# Patient Record
Sex: FEMALE | Race: WHITE
[De-identification: names, ages, dates, MRNs, and addresses within clinical notes are randomized per-mention and may not be internally consistent; named-entity substitution may affect disease eponyms.]

---

## 2019-04-23 ENCOUNTER — HOSPITAL ENCOUNTER (INPATIENT)
Dept: HOSPITAL 88 - ER | Age: 74
LOS: 6 days | Discharge: HOME | DRG: 871 | End: 2019-04-29
Attending: INTERNAL MEDICINE | Admitting: INTERNAL MEDICINE
Payer: MEDICARE

## 2019-04-23 VITALS — HEIGHT: 62 IN | WEIGHT: 113 LBS | BODY MASS INDEX: 20.8 KG/M2

## 2019-04-23 VITALS — SYSTOLIC BLOOD PRESSURE: 134 MMHG | DIASTOLIC BLOOD PRESSURE: 98 MMHG

## 2019-04-23 VITALS — DIASTOLIC BLOOD PRESSURE: 98 MMHG | SYSTOLIC BLOOD PRESSURE: 134 MMHG

## 2019-04-23 DIAGNOSIS — A41.9: Primary | ICD-10-CM

## 2019-04-23 DIAGNOSIS — J18.9: ICD-10-CM

## 2019-04-23 DIAGNOSIS — J44.0: ICD-10-CM

## 2019-04-23 DIAGNOSIS — I10: ICD-10-CM

## 2019-04-23 DIAGNOSIS — E11.9: ICD-10-CM

## 2019-04-23 DIAGNOSIS — J44.1: ICD-10-CM

## 2019-04-23 DIAGNOSIS — M06.9: ICD-10-CM

## 2019-04-23 DIAGNOSIS — J11.08: ICD-10-CM

## 2019-04-23 LAB
ALBUMIN SERPL-MCNC: 3 G/DL (ref 3.5–5)
ALBUMIN/GLOB SERPL: 0.6 {RATIO} (ref 0.8–2)
ALP SERPL-CCNC: 101 IU/L (ref 40–150)
ALT SERPL-CCNC: 15 IU/L (ref 0–55)
ANION GAP SERPL CALC-SCNC: 16.2 MMOL/L (ref 8–16)
BASOPHILS # BLD AUTO: 0.1 10*3/UL (ref 0–0.1)
BASOPHILS NFR BLD AUTO: 0.4 % (ref 0–1)
BUN SERPL-MCNC: 17 MG/DL (ref 7–26)
BUN/CREAT SERPL: 17 (ref 6–25)
CALCIUM SERPL-MCNC: 9.9 MG/DL (ref 8.4–10.2)
CHLORIDE SERPL-SCNC: 97 MMOL/L (ref 98–107)
CK MB SERPL-MCNC: 1.4 NG/ML (ref 0–5)
CK SERPL-CCNC: 32 IU/L (ref 29–168)
CO2 SERPL-SCNC: 23 MMOL/L (ref 22–29)
DEPRECATED INR PLAS: 0.98
DEPRECATED NEUTROPHILS # BLD AUTO: 20 10*3/UL (ref 2.1–6.9)
EGFRCR SERPLBLD CKD-EPI 2021: 55 ML/MIN (ref 60–?)
EOSINOPHIL # BLD AUTO: 0.3 10*3/UL (ref 0–0.4)
EOSINOPHIL NFR BLD AUTO: 1.1 % (ref 0–6)
ERYTHROCYTE [DISTWIDTH] IN CORD BLOOD: 13.6 % (ref 11.7–14.4)
GLOBULIN PLAS-MCNC: 4.7 G/DL (ref 2.3–3.5)
GLUCOSE SERPLBLD-MCNC: 123 MG/DL (ref 74–118)
HCT VFR BLD AUTO: 40.3 % (ref 34.2–44.1)
HGB BLD-MCNC: 12.8 G/DL (ref 12–16)
LYMPHOCYTES # BLD: 2.5 10*3/UL (ref 1–3.2)
LYMPHOCYTES NFR BLD AUTO: 9.9 % (ref 18–39.1)
MCH RBC QN AUTO: 27.6 PG (ref 28–32)
MCHC RBC AUTO-ENTMCNC: 31.8 G/DL (ref 31–35)
MCV RBC AUTO: 86.9 FL (ref 81–99)
MONOCYTES # BLD AUTO: 2.3 10*3/UL (ref 0.2–0.8)
MONOCYTES NFR BLD AUTO: 9.1 % (ref 4.4–11.3)
NEUTS SEG NFR BLD AUTO: 78.8 % (ref 38.7–80)
PLATELET # BLD AUTO: 409 X10E3/UL (ref 140–360)
POTASSIUM SERPL-SCNC: 3.2 MMOL/L (ref 3.5–5.1)
PROTHROMBIN TIME: 13.5 SECONDS (ref 11.9–14.5)
RBC # BLD AUTO: 4.64 X10E6/UL (ref 3.6–5.1)
SODIUM SERPL-SCNC: 133 MMOL/L (ref 136–145)

## 2019-04-23 PROCEDURE — 80053 COMPREHEN METABOLIC PANEL: CPT

## 2019-04-23 PROCEDURE — 82550 ASSAY OF CK (CPK): CPT

## 2019-04-23 PROCEDURE — 82948 REAGENT STRIP/BLOOD GLUCOSE: CPT

## 2019-04-23 PROCEDURE — 80048 BASIC METABOLIC PNL TOTAL CA: CPT

## 2019-04-23 PROCEDURE — 86140 C-REACTIVE PROTEIN: CPT

## 2019-04-23 PROCEDURE — 87040 BLOOD CULTURE FOR BACTERIA: CPT

## 2019-04-23 PROCEDURE — 83735 ASSAY OF MAGNESIUM: CPT

## 2019-04-23 PROCEDURE — 84132 ASSAY OF SERUM POTASSIUM: CPT

## 2019-04-23 PROCEDURE — 85025 COMPLETE CBC W/AUTO DIFF WBC: CPT

## 2019-04-23 PROCEDURE — 94640 AIRWAY INHALATION TREATMENT: CPT

## 2019-04-23 PROCEDURE — 82553 CREATINE MB FRACTION: CPT

## 2019-04-23 PROCEDURE — 84484 ASSAY OF TROPONIN QUANT: CPT

## 2019-04-23 PROCEDURE — 71260 CT THORAX DX C+: CPT

## 2019-04-23 PROCEDURE — 71045 X-RAY EXAM CHEST 1 VIEW: CPT

## 2019-04-23 PROCEDURE — 99284 EMERGENCY DEPT VISIT MOD MDM: CPT

## 2019-04-23 PROCEDURE — 85379 FIBRIN DEGRADATION QUANT: CPT

## 2019-04-23 PROCEDURE — 87400 INFLUENZA A/B EACH AG IA: CPT

## 2019-04-23 PROCEDURE — 93005 ELECTROCARDIOGRAM TRACING: CPT

## 2019-04-23 PROCEDURE — 83880 ASSAY OF NATRIURETIC PEPTIDE: CPT

## 2019-04-23 PROCEDURE — 85610 PROTHROMBIN TIME: CPT

## 2019-04-23 PROCEDURE — 80202 ASSAY OF VANCOMYCIN: CPT

## 2019-04-23 PROCEDURE — 83605 ASSAY OF LACTIC ACID: CPT

## 2019-04-23 PROCEDURE — 36415 COLL VENOUS BLD VENIPUNCTURE: CPT

## 2019-04-23 RX ADMIN — AZITHROMYCIN SCH MLS/HR: 100 INJECTION, POWDER, LYOPHILIZED, FOR SOLUTION INTRAVENOUS at 22:00

## 2019-04-23 RX ADMIN — CEFTRIAXONE SCH MLS/HR: 100 INJECTION, POWDER, FOR SOLUTION INTRAVENOUS at 19:44

## 2019-04-23 RX ADMIN — LEVALBUTEROL HYDROCHLORIDE SCH MG: 1.25 SOLUTION RESPIRATORY (INHALATION) at 19:00

## 2019-04-23 RX ADMIN — LEVALBUTEROL HYDROCHLORIDE SCH MG: 1.25 SOLUTION RESPIRATORY (INHALATION) at 23:00

## 2019-04-23 RX ADMIN — METHYLPREDNISOLONE SODIUM SUCCINATE SCH MG: 125 INJECTION, POWDER, FOR SOLUTION INTRAMUSCULAR; INTRAVENOUS at 22:00

## 2019-04-23 NOTE — NUR
Transferred from ER via stretcher. SOB noted, 02 sat 100% 2L NC, encourage to take slow deep 
breaths in nose out mouth. Patient in tripod position. Will continue to monitor.

## 2019-04-23 NOTE — XMS REPORT
[facility] - Continuity of Care Document

                             Created on: 2019



SUSI GALICIA

External Reference #: [id number]

: 1945

Sex: Female



Demographics







                          Address                   [address] [place]  [address]

 

                          Home Phone                [phone]

 

                          Preferred Language        English

 

                          Marital Status            

 

                          Restoration Affiliation     Unknown

 

                          Race                      White

 

                          Ethnic Group              Non-





Author







                          Author                    [facility] [place]

 

                          Address                   [address]

[place]  [address]



 

                          Phone                     [phone];ext=







Support







                Name            Relationship    Address         Phone

 

                    KRISTI GALICIA    Next Of Kin         [address] [place]  [address] [phone]

 

                    KRISTI GALICIA    ECON                [address] [place]  [address] [phone]







Care Team Providers







                    Care Team Member Name    Role                Phone

 

                    DR JOEL LOYOLA             [phone]

 

                    DR JOEL LOYOLA    [place]             [phone]







Hospital Admission Diagnosis







                    Code                Admission Diagnosis    Date

 

                               Acute exacerbation of chronic obstructive airways disease     







Social History







           Element Description    Code       Description    Smoking Status Code System    Start Date    

End Date

 

           Smoking Status    [id number]    Former smoker    SNOMED-CT                







Problems







           Code       Code System    Problem Name    Start Date    End Date    Status

 

                    [id number]           SNOMED-CT           Acute exacerbation of chronic obstructive airways disease 

                    2019                              Active







Medications







        RxNorm    Medication    Dose    Route    Instructions    Indications    Start Date    End Date

                                        Status

 

                          [id number]                   Albuterol 0.833 MG/ML / Ipratropium Bromide 0.167 MG/ML Inhalant Solution

                    3 milliliter        Inhalation          inhaled every 4 to 6 hours as needed. (as needed for 

wheezing)       wheezing                                        Active







Allergies

* No Known Allergies





Results





******** Radiology Results ********







Order: [id number] XR CHEST 2 PA LATERAL* Exam Completion Date:2019 13:29





PA and lateral chest:Exam Date:  2019History:  COPD, wheezingCompared to Oc
tober 2018. The lungs are again hyperinflated but appearclear. Cardiac size 
is within normal limits. Mild atherosclerotic calcificationin the aortic arch is
again noted. There are no significant bony abnormalities.Impression: Stable lisbeth
st. Hyperinflation with no acute cardiopulmonaryabnormalities identified.This fi
nal report was electronically signed by Dr Liss Parson MD 20191:[id number] PMDi
ctated By: LISS PARSONDate:  2019 13:59







Vital Signs

* No data in the system





Advance Directives





YES LIVING WILL





             Directive Type    Effective Date         Notes        Supporting Document

 

                    Name                Address             Phone

 

             No Directive Type specified    2013 21:27    Not Specified    Not Specified    Not

 Specified                None                      No







Patient does NOT have Living Will





             Directive Type    Effective Date         Notes        Supporting Document

 

                    Name                Address             Phone

 

             No Directive Type specified    08/10/2018 11:03    Not Specified    Not Specified    Not

 Specified                None                      No







Family History

* No Data Reported





Plan of Care

* No data in the system





Procedures







             Code         Code System    Procedure Name    Target Site    Date of Procedure

 

                                       XR CHEST 2 PA LATERAL                 2019 14:05







Encounters







                Date            Code            Diagnosis       Status

 

                                (ICD10) - J441    COPD WITH ACUTE EXACERBATION    Active







Immunizations

* No data in the system





Functional Status

* No data in the system





Hospital Discharge Instructions

* No data in the system

## 2019-04-23 NOTE — XMS REPORT
[facility]

                             Created on: 2019



TIFFANY GALICIAELZBIETA

External Reference #: [id number]

: 1945

Sex: Female



Demographics







                          Address                   [address] [place]  [address]

 

                          Home Phone                [phone]

 

                          Preferred Language        Unknown

 

                          Marital Status            Unknown

 

                          Anglican Affiliation     Unknown

 

                          Race                      Other Race

 

                          Ethnic Group              UNK





Author







                          Author                    Najjar, Husam

 

                          Organization              [employer]

 

                          Address                   Unknown

 

                          Phone                     Unavailable







Care Team Providers







                    Care Team Member Name    Role                Phone

 

                    Najjar, Husam      CP                  Unavailable



                                                                



Allergies

          No Known Allergies                                                    
                                   



Problems

          





             Problem Type    Condition    Code         Onset Dates    Condition Status

 

             Problem      COPD with chronic bronchitis    J44.9                     Active

 

                          Problem                   Rheumatoid arthritis involving multiple sites, unspecified rheumatoid factor

 presence           M06.9                                   Active

 

             Problem      Chronic respiratory failure with hypoxia    J96.11                    Active

 

             Assessment    Acute and chronic respiratory failure with hypoxia    J96.21                    Active



 

             Assessment    Acute and chronic respiratory failure with hypercapnia    J96.22                    Active



 

             Assessment    COPD with acute exacerbation    J44.1                     Active



                                                                                
                                                         



Medications

          





        Medication    Code System    Code    Instructions    Start Date    End Date    Status    Dosage



 

        Amlodipine Besylate    NDC     39653426079    10 MG Orally Once a day                    Active    1 

tablet

 

        Aleve    ND     52843434299    220 MG Orally every 12 hrs                    Active    1 tablet with

 food or milk as needed

 

        Folic Acid    NDC     35263935435    1 MG Orally Once a day                    Active    1 tablet

 

           Levalbuterol HCl    NDC        27799579410    0.63 MG/3ML Inhalation every 8 hrs PRN                

                          Active                    3 ml

 

          Symbicort    NDC       40734258746    160-4.5 MCG/ACT Inhalation Twice a day                        Active

                                        2 puffs

 

                ProAir RespiClick    NDC             91570723417     108 (90 Base) MCG/ACT Inhalation every 6 hrs

                                                Active          2 puffs as needed

 

             Anoro Ellipta    NDC          58009383017    62.5-25 MCG/INH Inhalation Once a day    Oct 23, 

2018                2019        Active              1 puff

 

          Hydrocodone-Acetaminophen    NDC       20758044143    5-325 MG Orally ONCE A DAY                        Active

                                        1 tablet as needed

 

        Benazepril HCl    NDC     72273423397    40 MG Orally Once a day                    Active    1 tablet



 

        Leucovorin Calcium    NDC     28075265177    5 MG Orally ONCE A WEEK                    Active    1 tablet



 

        Methotrexate    NDC     90056352592    2.5 MG Orally ONCE A WEEK                    Active    8 tabs

 

        Zolpidem Tartrate    NDC     99075114164    10 MG Orally Once a day                    Active    1 tablet

 at bedtime as needed



                                                                                
                                                                                
                          



Results

          No Known Results                                                      
             



Summary Purpose

          [employer] Submission

## 2019-04-23 NOTE — XMS REPORT
Patient Summary Document

                             Created on: 2019



SUSI GALICIA

External Reference #: [id number]

: 1945

Sex: Female



Demographics







                          Address                   [address] [place]  [address]

 

                          Home Phone                [phone]

 

                          Preferred Language        Unknown

 

                          Marital Status            Unknown

 

                          Catholic Affiliation     Unknown

 

                          Race                      Unknown

 

                                        Additional Race(s)  

 

                          Ethnic Group              Unknown





Author







                          Author                    [facility]

 

                          [facility]

 

                          Address                   Unknown

 

                          Phone                     Unavailable







Care Team Providers







                    Care Team Member Name    Role                Phone

 

                    RENEA BURDEN    Unavailable         Unavailable

 

                    SHAE MANN        Unavailable         Unavailable

 

                    DR JOEL LOYOLA    Unavailable         Unavailable

 

                    NAJJAR, HUSAM      Unavailable         Unavailable

 

                    DR MARIALUISA BOUCHER    Unavailable         Unavailable







Problems

This patient has no known problems.



Allergies, Adverse Reactions, Alerts

This patient has no known allergies or adverse reactions.



Medications

This patient has no known medications.



Results







           Test Description    Test Time    Test Comments    Text Results    Atomic Results    Result

 Comments

 

                CT CHEST W      2019 17:45:00                                                                

                                          [place] [address]     
Patient Name: SUSI GALICIA                                   MR #: 
[id number]                     : 1945                                  
Age/Sex: 74/F  Acct #: [id number]                              Req #: [id number]
[id number]  Adm Physician:                                                      
Ordered by: GUY BURDEN MD                            Report #: [id number]
       Location: ER                                      Room/Bed:              
      
___________________________________________________________________________________________________
   Procedure: [id number] CT/CT CHEST W  Exam Date: 19                      
     Exam Time: 1727                                              REPORT STATUS:
Signed    EXAMINATION: CT scan of the chest  with contrast.      TECHNIQUE: 
Helical CT images of the chest were performed from the lung apices   to the 
level of the adrenal glands after the intravenous administration of 100   cc of 
Isovue 300.  Coronal and sagittal reformatted images were obtained.Dose   
modulation, iterative reconstruction, and/or weight based adjustment of the   
mA/kV was utilized to reduce the radiation dose to as low as reasonably   
achievable.       COMPARISON: None.      CLINICAL HISTORY:Chest pain      
DISCUSSION:      LINES/TUBES:  None.      LUNGS AND AIRWAYS:  The lungs are 
hyperinflated with emphysematous change. Mild   reticulations at the lung bases.
Scattered Tree-in-bud nodularity in the right   lower lung image 64 and 53 for 
example.        No pulmonary embolism.      PLEURA: No pneumothorax or pleural 
effusions.        HEART AND MEDIASTINUM: The thyroid gland is normal.   The 
heart and pericardium   are within normal limits.      LYMPH NODES: There is no 
mediastinal, hilar or axillary lymphadenopathy.      ABDOMEN: Limited contrast-
enhanced views of the upper abdomen show no   abnormality within the visualized 
liver, spleen, pancreas, or kidneys. The   adrenal glands are normal.      BONES
AND SOFT TISSUES: No acute bony abnormalities.      IMPRESSION:       No 
pulmonary embolism.      Pulmonary edema.      Scattered tree-in-bud nodularity 
predominantly in the right lower lung may   reflect infectious bronchiolitis or 
aspiration.      Signed by: Dr. Andrew Palisch, M.D. on 2019 5:49 PM       
Dictated By: ANDREW R PALISCH MD  Electronically Signed By: ANDREW R PALISCH MD 
on 19  Transcribed By: EZIO on 19       COPY TO:   
GUY BURDEN MD                     

 

                CHEST SINGLE (PORTABLE)    2019 15:59:00                                                   

                                                       [place] [address]      Patient Name: SUSI GALICIA                      
            MR #: [id number]                     : 1945                
                  Age/Sex: 74/F  Acct #: [id number]                           
  Req #: [id number]  Adm Physician:                                             
        Ordered by: GUY BURDEN MD                            Report #: 
[id number]        Location: ER                                      Room/Bed:    
                
___________________________________________________________________________________________________
   Procedure: [id number] DX/CHEST SINGLE (PORTABLE)  Exam Date: 19         
                  Exam Time: 1500                                              
REPORT STATUS: Signed    Examination: Single AP view of the chest.      
COMPARISON: None.      INDICATION: Shortness of breath           DISCUSSION:    
 Lines/tubes:  None.      Lungs:  Lungs are hyperinflated. No consolidation. 
Age-related interstitial   change.      Pleura:  There is no pleural effusion or
pneumothorax.      Heart and mediastinum:  The heart and the mediastinum are 
unremarkable.      Bones and soft tissues:  No acute bony abnormalities.        
  IMPRESSION:       1.   No acute cardiopulmonary abnormalities.      Signed by:
Dr. Andrew Palisch, M.D. on 2019 4:00 PM        Dictated By: ANDREW R PALISCH MD  Electronically Signed By: ANDREW R PALISCH MD on 19 1600  
Transcribed By: EZIO on 19 1600       COPY TO:   GUY BURDEN MD  
                                         

 

                CULTURE, BLOOD    2019 16:03:00                    Specimen: BloodCollected: 2019 01:43

 Status: Final      Last Updated: 2019 16:03          Culture Result 
(Final) (Final)    No Growth After 5 Days       

 

                CULTURE, ANAEROBE BLOOD    2019 16:03:00                    Specimen: BloodCollected: 2019

 01:43 Status: Final      Last Updated: 2019 16:03          Culture Result
(Final) (Final)    No Growth After 5 Days       

 

                CULTURE, BLOOD    2019 16:03:00    To start 15mins after 1st culture    Specimen:

 BloodCollected: 2019 01:43 Status: Final      Last Updated: 2019 
16:03       (1) To start 15mins after 1st culture     Culture Result (Final) 
(Final)    No Growth After 5 Days        

 

                CULTURE, ANAEROBE BLOOD    2019 16:03:00                    Specimen: BloodCollected: 2019

 01:43 Status: Final      Last Updated: 2019 16:03          Culture Result
(Final) (Final)    No Growth After 5 Days       

 

                [facility]             2019 05:02:00                      

 

   

 

                Glucose (test code=GLU)    129 mg/dl                  

 

                BUN (test code=BUN)    36.0 mg/dl      6.0-17.0         

 

                Creatinine (test code=CREA)    0.9 mg/dl       0.4-1.2          

 

                Sodium (test code=NA)    142 mmol/l      137-145          

 

                Potassium (test code=K)    4.2 mmol/l      3.5-5.0          

 

                Chloride (test code=CL)    109 mmol/l                 

 

                CO2 (test code=CO2)    27 mmol/l       22-30            

 

                Calcium (test code=CALC)    7.9 mg/dl       8.4-10.2         

 

                EGFR if  (test code=EGFRAA)    >60 mL/min/1.73m\S\2                     

 

                EGFR if Non- (test code=EGFRNA)    >60 mL/min/1.73m\S\2                    Estimated

 Glomerular Filtration Rate (eGFR) Reference Intervals Decision Points for 18 
years and older and average body mass:      >=60                  Does not 
exclude kidney disease. 30 - 59             Suggests moderate chronic kidney 
disease and                            indicates the need for further 
investigation                              including assessment of proteinuria 
and                                       cardiovascular factors. < 30          
    Usually indicates a need for referral for assessment                        
   and management of chronic kidney failure.





CBC WITH AUTO [id number] 04:54:00* 





                Test Item       Value           Reference Range    Comments

 

                WBC (test code=WBC)    8.04 10\S\3/ul    4.80-10.80       

 

                RBC (test code=RBC)    4.05 10\S\6/ul    4.20-5.40        

 

                Hemoglobin (test code=HGB)    11.7 gm/dl      12.0-14.0        

 

                Hematocrit (test code=HCT)    36.8 %          37.0-47.0        

 

                MCV (test code=MCV)    90.9 fL         81.0-99.0        

 

                MCH (test code=MCH)    28.9 pg         27.0-31.0        

 

                MCHC (test code=MCHC)    31.8 gm/dl      33.0-37.0        

 

                RDW (test code=RDWVC)    14.6 %          11.5-14.5        

 

                Platelet (test code=PLT)    247 10\S\3/ul    130-400          

 

                MPV (test code=MPV)    11.3 fL         7.4-10.4         

 

                NE% (test code=NE)    91.8 %          42.0-75.0        

 

                LY% (test code=LY)    5.6 %           13.0-42.0        

 

                MO% (test code=MO)    1.5 %           4.0-14.0         

 

                EO% (test code=EO)    0.0 %           1.0-5.0          

 

                BA% (test code=BA)    0.1 %           0.0-3.0          

 

                IG% (test code=IG%)    1.0 %           0.0-0.4          





LACTIC ACID [id number] 05:24:00* 





                Test Item       Value           Reference Range    Comments

 

                LACTATE (test code=LAC)    1.0 mmol/l      0.7-2.0          





URINALYSIS WITH [id number] 02:20:00* 





                Test Item       Value           Reference Range    Comments

 

                Color (test code=UCOLR)    LT. YELLOW                       

 

                Clarity (test code=UCLAR)    CLEAR                            

 

                Glucose (test code=UGLUC)    NEGATIVE        NEGATIVE         

 

                Bilirubin (test code=UBILI)    NEGATIVE        NEGATIVE         

 

                Ketones (test code=UKET)    NEGATIVE        NEGATIVE         

 

                Specific Gravity (test code=USPGR)    1.010           1.005-1.030      

 

                Blood (test code=UBLD)    NEGATIVE        NEGATIVE         

 

                PH (test code=UPH)    5.0             4.5-8.0          

 

                Protein (test code=UPROT)    NEGATIVE        NEGATIVE         

 

                Urobilinogen (test code=U UROB)    0.2             >0.2             

 

                Nitrite (test code=UNITR)    NEGATIVE        NEGATIVE         

 

                Leukocyte Esterase (test code=ULEUK)    NEGATIVE        NEGATIVE         

 

                WBC (test code=WBCUR)    0-5             0-5              

 

                RBC (test code=RBCUR)    None Seen       0-5              

 

                Epithial Cells (test code=U EPI)    0-5             0-10             

 

                Mucous (test code=UMUC)    None Seen       None Seen        

 

                Bacteria (test code=UBACT)    None Seen       None Seen,Trace     





[id number] 01:51:00* 





                Test Item       Value           Reference Range    Comments

 

                Glucose (test code=GLU)    140 mg/dl                  

 

                BUN (test code=BUN)    52.0 mg/dl      6.0-17.0         

 

                Creatinine (test code=CREA)    1.3 mg/dl       0.4-1.2          

 

                Sodium (test code=NA)    135 mmol/l      137-145          

 

                Potassium (test code=K)    3.7 mmol/l      3.5-5.0          

 

                Chloride (test code=CL)    101 mmol/l                 

 

                CO2 (test code=CO2)    28 mmol/l       22-30            

 

                Calcium (test code=CALC)    8.0 mg/dl       8.4-10.2         

 

                EGFR if  (test code=EGFRAA)    51 mL/min/1.73m\S\2                     

 

                EGFR if Non- (test code=EGFRNA)    42 mL/min/1.73m\S\2                    Estimated

 Glomerular Filtration Rate (eGFR) Reference Intervals Decision Points for 18 
years and older and average body mass:      >=60                  Does not 
exclude kidney disease. 30 - 59             Suggests moderate chronic kidney 
disease and                            indicates the need for further 
investigation                              including assessment of proteinuria 
and                                       cardiovascular factors. < 30          
    Usually indicates a need for referral for assessment                        
   and management of chronic kidney failure.





CBC WITH AUTO [id number] 01:38:00* 





                Test Item       Value           Reference Range    Comments

 

                WBC (test code=WBC)    9.75 10\S\3/ul    4.80-10.80       

 

                RBC (test code=RBC)    3.95 10\S\6/ul    4.20-5.40        

 

                Hemoglobin (test code=HGB)    11.7 gm/dl      12.0-14.0        

 

                Hematocrit (test code=HCT)    35.6 %          37.0-47.0        

 

                MCV (test code=MCV)    90.1 fL         81.0-99.0        

 

                MCH (test code=MCH)    29.6 pg         27.0-31.0        

 

                MCHC (test code=MCHC)    32.9 gm/dl      33.0-37.0        

 

                RDW (test code=RDWVC)    14.7 %          11.5-14.5        

 

                Platelet (test code=PLT)    256 10\S\3/ul    130-400          

 

                MPV (test code=MPV)    11.2 fL         7.4-10.4        "NOT MEASURED" RESULTS ARE DISPLAYED WHEN

 THE INSTRUMENT HAS A SUPPRESSED OR UNREPORTABLE RESULT.  THIS WILL MOST OFTEN 
HAPPEN WITH THE MPV WHEN THERE IS AN ABNORMAL PLATELET DISTRIBUTION DUE TO A CR
ITICAL LOW VALUE OR PLATELET CLUMPING.  THE RDW MAY BE SUPPRESSED IF THERE ARE 
MULTIPLE PEAKS PRESENT ON THE RBC HISTOGRAM.  IN THIS CASE, A MANUAL REVIEW OF 
THE SLIDE WILL BE PERFORMED, AND RBC MORPHOLOGY WILL BE NOTED ON THE REPORT.

 

                NE% (test code=NE)    90.2 %          42.0-75.0        

 

                LY% (test code=LY)    3.5 %           13.0-42.0        

 

                MO% (test code=MO)    5.5 %           4.0-14.0         

 

                EO% (test code=EO)    0.0 %           1.0-5.0          

 

                BA% (test code=BA)    0.1 %           0.0-3.0          

 

                IG% (test code=IG%)    0.7 %           0.0-0.4          





[id number] 15:26:00* 





                Test Item       Value           Reference Range    Comments

 

                pH (ABG) (test code=BGPH)    7.391           7.350-7.450      

 

                pCO2(T) (test code=BGPCO2(T))    42.8 mm Hg      35.0-45.0        

 

                pO2(T) (test code=BGPO2(T))    67.3 mm Hg      80.0-100.0       

 

                sO2 (test code=BGSO2)    94.7 %          90.0-99.0        

 

                Na+ (test code=BGCNA+)    137.9 mmol/l    135.0-148.0      

 

                K+ (test code=BGCK+)    3.86 mmol/l     3.50-4.50        

 

                Ca2+ (test code=BGCCA2+)    1.190 mmol/l    1.120-1.320      

 

                Cl- (test code=BGCCL-)    98 mmol/l                  

 

                tHb (test code=BGCTHB)    12.0 gm/dl      11.5-17.4        

 

                Hct (test code=BGHCT)    42.9            35.0-50.0        

 

                O2Hb (test code=GMHD3EX)    93.3 %          95.0-99.0        

 

                COHb (test code=BGFCOHB)    <2.80 %         0.5-2.5          

 

                MetHB (test code=BGMETHB)    <1.30 %         0.4-1.5          

 

                Gluc (test code=BGCGLU)    159.0 mg/dl     60.0-110.0       

 

                Lac (test code=BGCLAC)    2.60 mmol/l     1.0-1.7          

 

                Barometric Pressure (test code=BGBARO)    763.4 mm Hg     450.0-1000.0     

 

                BE(act) (test code=BGBEACT)    1 mmol/l                         

 

                HCO3 (test code=BGHCO3)    25 meq/L        22-26            

 

                ctCO2(B) (test code=BGCTCO2(B))    23 mmol/l                        

 

                FIO2 (fO2(I) (test code=BGFIO2)    0.28 %                           

 

                Drawn By (test code=BGDRAWNBY)    YENIFER CHRISTOPHER                     

 

                Collection Date (test code=BGDTCOL)    2019                       

 

                Collection Time (test code=BGCTM)    15:16                            

 

                Sample Site (test code=BGSAMPLESITE)    R Brachial                       

 

                Sample Type (test code=BGSAMPLETYPE)    Arterial                         

 

                Allens Test (test code=BGALLEN)    Acceptable                       

 

                Notified By (test code=BGNOTIFIEDBY)    YENIFER CHRISTOPHER                     

 

                Notified Whom (test code=BGNOTIFIEDWHOM)    Dr Mann                          

 

                Date Notified (test code=BGDTNOTIFIED)    2019                       

 

                Time Notified (test code=BGTMNOTIFIED)    15:24                            

 

                O2 Device (test code=BPW4DYY2)    Nasal Cannula                     

 

                Instrument ID (test code=BGINSTRID)    [id number]                            

 

                Reported By (test code=BGREPORTEDBY)    YENIFER CHRISTOPHER                     





LACTIC ACID [id number] 11:58:00* 





                Test Item       Value           Reference Range    Comments

 

                LACTATE (test code=LAC)    3.7 mmol/l      0.7-2.0          





Critical values were called to Deysi López RN by [id number] on 19 11:58 CST.
Results were not read back.XR CHEST AP/PA 1 [id number] 10:53:27A single 
frontal view of the chest.HISTORY:  Cough, shortness of breath, COPDCOMPARISON: 
Chest radiograph January 15, 2019.DISCUSSION:Portable technique, which partially
limits sensitivity and specificity.Overlying monitoring leads.Tubes/Lines:  
NoneLungs and pleura:Stable appearing biapical pleural-parenchymal scarring.The 
lungs are mildly hyperinflated.No evidence of a consolidative pneumonia or 
pulmonary alveolar edema.No definite pleural effusion or pneumothorax is ident
ified.Heart and mediastinum:The cardiomediastinal silhouette appears unremarkabl
e.Bones and soft tissues:Unremarkable, given this limited evaluation.IMPRESSION:
Persistent hyperinflation, compatible with the provided history of COPD.This fin
al report was electronically signed by Dr Elia Momin DO 201910:47 AMDicta
violeta By: Maia MOMINte:  2019 10:[id number] 05:37:00* 





                Test Item       Value           Reference Range    Comments

 

                Magnesium (test code=MG)    2.3 mg/dl       1.6-2.6          





[id number] 05:21:00* 





                Test Item       Value           Reference Range    Comments

 

                Glucose (test code=GLU)    146 mg/dl                  

 

                BUN (test code=BUN)    40.0 mg/dl      6.0-17.0         

 

                Creatinine (test code=CREA)    1.0 mg/dl       0.4-1.2          

 

                Sodium (test code=NA)    138 mmol/l      137-145          

 

                Potassium (test code=K)    4.1 mmol/l      3.5-5.0          

 

                Chloride (test code=CL)    101 mmol/l                 

 

                CO2 (test code=CO2)    32 mmol/l       22-30            

 

                Calcium (test code=CALC)    9.1 mg/dl       8.4-10.2         

 

                EGFR if  (test code=EGFRAA)    >60 mL/min/1.73m\S\2                     

 

                EGFR if Non- (test code=EGFRNA)    57 mL/min/1.73m\S\2                    Estimated

 Glomerular Filtration Rate (eGFR) Reference Intervals Decision Points for 18 
years and older and average body mass:      >=60                  Does not 
exclude kidney disease. 30 - 59             Suggests moderate chronic kidney 
disease and                            indicates the need for further 
investigation                              including assessment of proteinuria 
and                                       cardiovascular factors. < 30          
    Usually indicates a need for referral for assessment                        
   and management of chronic kidney failure.





CBC WITH AUTO [id number] 04:49:00* 





                Test Item       Value           Reference Range    Comments

 

                WBC (test code=WBC)    8.38 10\S\3/ul    4.80-10.80       

 

                RBC (test code=RBC)    4.05 10\S\6/ul    4.20-5.40        

 

                Hemoglobin (test code=HGB)    11.7 gm/dl      12.0-14.0        

 

                Hematocrit (test code=HCT)    36.9 %          37.0-47.0        

 

                MCV (test code=MCV)    91.1 fL         81.0-99.0        

 

                MCH (test code=MCH)    28.9 pg         27.0-31.0        

 

                MCHC (test code=MCHC)    31.7 gm/dl      33.0-37.0        

 

                RDW (test code=RDWVC)    14.4 %          11.5-14.5        

 

                Platelet (test code=PLT)    243 10\S\3/ul    130-400          

 

                MPV (test code=MPV)    11.0 fL         7.4-10.4         

 

                NE% (test code=NE)    89.0 %          42.0-75.0        

 

                LY% (test code=LY)    5.8 %           13.0-42.0        

 

                MO% (test code=MO)    4.7 %           4.0-14.0         

 

                EO% (test code=EO)    0.0 %           1.0-5.0          

 

                BA% (test code=BA)    0.0 %           0.0-3.0          

 

                IG% (test code=IG%)    0.5 %           0.0-0.4          





[id number] 05:55:00* 





                Test Item       Value           Reference Range    Comments

 

                Glucose (test code=GLU)    126 mg/dl                  

 

                BUN (test code=BUN)    27.0 mg/dl      6.0-17.0         

 

                Creatinine (test code=CREA)    0.8 mg/dl       0.4-1.2          

 

                Sodium (test code=NA)    137 mmol/l      137-145          

 

                Potassium (test code=K)    4.3 mmol/l      3.5-5.0          

 

                Chloride (test code=CL)    100 mmol/l                 

 

                CO2 (test code=CO2)    30 mmol/l       22-30            

 

                Calcium (test code=CALC)    9.3 mg/dl       8.4-10.2         

 

                EGFR if  (test code=EGFRAA)    >60 mL/min/1.73m\S\2                     

 

                EGFR if Non- (test code=EGFRNA)    >60 mL/min/1.73m\S\2                    Estimated

 Glomerular Filtration Rate (eGFR) Reference Intervals Decision Points for 18 
years and older and average body mass:      >=60                  Does not 
exclude kidney disease. 30 - 59             Suggests moderate chronic kidney 
disease and                            indicates the need for further 
investigation                              including assessment of proteinuria 
and                                       cardiovascular factors. < 30          
    Usually indicates a need for referral for assessment                        
   and management of chronic kidney failure.





CBC WITH AUTO [id number] 05:24:00* 





                Test Item       Value           Reference Range    Comments

 

                WBC (test code=WBC)    9.28 10\S\3/ul    4.80-10.80       

 

                RBC (test code=RBC)    4.06 10\S\6/ul    4.20-5.40        

 

                Hemoglobin (test code=HGB)    11.9 gm/dl      12.0-14.0        

 

                Hematocrit (test code=HCT)    36.5 %          37.0-47.0        

 

                MCV (test code=MCV)    89.9 fL         81.0-99.0        

 

                MCH (test code=MCH)    29.3 pg         27.0-31.0        

 

                MCHC (test code=MCHC)    32.6 gm/dl      33.0-37.0        

 

                RDW (test code=RDWVC)    14.1 %          11.5-14.5        

 

                Platelet (test code=PLT)    261 10\S\3/ul    130-400          

 

                MPV (test code=MPV)    10.8 fL         7.4-10.4        "NOT MEASURED" RESULTS ARE DISPLAYED WHEN

 THE INSTRUMENT HAS A SUPPRESSED OR UNREPORTABLE RESULT.  THIS WILL MOST OFTEN 
HAPPEN WITH THE MPV WHEN THERE IS AN ABNORMAL PLATELET DISTRIBUTION DUE TO A CR
ITICAL LOW VALUE OR PLATELET CLUMPING.  THE RDW MAY BE SUPPRESSED IF THERE ARE 
MULTIPLE PEAKS PRESENT ON THE RBC HISTOGRAM.  IN THIS CASE, A MANUAL REVIEW OF 
THE SLIDE WILL BE PERFORMED, AND RBC MORPHOLOGY WILL BE NOTED ON THE REPORT.

 

                NE% (test code=NE)    84.0 %          42.0-75.0        

 

                LY% (test code=LY)    8.4 %           13.0-42.0        

 

                MO% (test code=MO)    7.2 %           4.0-14.0         

 

                EO% (test code=EO)    0.0 %           1.0-5.0          

 

                BA% (test code=BA)    0.1 %           0.0-3.0          

 

                IG% (test code=IG%)    0.3 %           0.0-0.4          





[id number] 18:37:00* 





                Test Item       Value           Reference Range    Comments

 

                Glucose (test code=GLU)    147 mg/dl                  

 

                BUN (test code=BUN)    16.0 mg/dl      7.0-18.0         

 

                Creatinine (test code=CREA)    0.8 mg/dl       0.5-1.3          

 

                Sodium (test code=NA)    136 mmol/l      137-145          

 

                Potassium (test code=K)    4.7 mmol/l      3.5-5.1          

 

                Chloride (test code=CL)    101 mmol/l                 

 

                CO2 (test code=CO2)    27 mmol/l       21-32            

 

                Calcium (test code=CALC)    8.7 mg/dl       8.5-10.1         

 

                EGFR if  (test code=EGFRAA)    >60 mL/min/1.73m\S\2                     

 

                EGFR if Non- (test code=EGFRNA)    >60 mL/min/1.73m\S\2                    Estimated

 Glomerular Filtration Rate (eGFR) Reference Intervals Decision Points for 18 
years and older and average body mass:      >=60                  Does not 
exclude kidney disease. 30 - 59             Suggests moderate chronic kidney 
disease and                            indicates the need for further 
investigation                              including assessment of proteinuria 
and                                       cardiovascular factors. < 30          
    Usually indicates a need for referral for assessment                        
   and management of chronic kidney failure.





CBC WITH AUTO [id number] 18:35:00* 





                Test Item       Value           Reference Range    Comments

 

                WBC (test code=WBC)    3.78 10\S\3/ul    4.80-10.80       

 

                RBC (test code=RBC)    4.82 10\S\6/ul    4.20-5.40        

 

                Hemoglobin (test code=HGB)    13.8 gm/dl      12.0-14.0        

 

                Hematocrit (test code=HCT)    44.3 %          37.0-47.0        

 

                MCV (test code=MCV)    91.9 fL         81.0-99.0        

 

                MCH (test code=MCH)    28.6 pg         27.0-31.0        

 

                MCHC (test code=MCHC)    31.2 gm/dl      33.0-37.0        

 

                RDW (test code=RDWVC)    14.3 %          11.5-14.5        

 

                Platelet (test code=PLT)    186 10\S\3/ul    130-400          

 

                MPV (test code=MPV)    11.4 fL         7.4-10.4         

 

                NE% (test code=NE)    82.7 %          42.0-75.0        

 

                LY% (test code=LY)    14.6 %          13.0-42.0        

 

                MO% (test code=MO)    2.1 %           4.0-14.0         

 

                EO% (test code=EO)    0.0 %           1.0-5.0          

 

                BA% (test code=BA)    0.3 %           0.0-3.0          

 

                IG% (test code=IG%)    0.3 %           0.0-0.4          





MAGNESIUM2019-01-15 14:57:00* 





                Test Item       Value           Reference Range    Comments

 

                Magnesium (test code=MG)    3.9 mg/dl       1.6-2.6          





CMP2019-01-15 14:55:00* 





                Test Item       Value           Reference Range    Comments

 

                Glucose (test code=GLU)    112 mg/dl                  

 

                BUN (test code=BUN)    14.0 mg/dl      6.0-17.0         

 

                Creatinine (test code=CREA)    0.8 mg/dl       0.4-1.2          

 

                Sodium (test code=NA)    137 mmol/l      137-145          

 

                Potassium (test code=K)    4.4 mmol/l      3.5-5.0          

 

                Chloride (test code=CL)    101 mmol/l                 

 

                CO2 (test code=CO2)    29 mmol/l       22-30            

 

                Calcium (test code=CALC)    9.2 mg/dl       8.4-10.2         

 

                T Protein (test code=TP)    7.7 gm/dl       5.1-8.7          

 

                Albumin (test code=ALB)    3.6 gm/dl       3.5-4.6          

 

                A/G Ratio (test code=AGRAT)    0.9 %           1.1-2.2          

 

                AST (SGOT) (test code=AST)    25 U/L          11-36            

 

                ALT (SGPT) (test code=ALT)    21 U/L          11-40            

 

                Alkaline Phos (test code=ALKP)    78 U/L                     

 

                Total Bilirubin (test code=TBIL)    0.2 mg/dl       0.2-1.2          

 

                Globulin (test code=GLOBU)    4.1 gm/dl       2.3-3.5          

 

                Calcium, Corrected (test code=CALCCORR)    9.5 mg/dl       8.4-10.2        Various formulas exist

 for corrected serum calcium results, each yielding different values.  This 
corrected result was based on the formula: Corrected Calcium=SerumCalcium + [0.8
 * ( 4 - SerumAlbumin)] 

 

                EGFR if  (test code=EGFRAA)    >60 mL/min/1.73m\S\2                     

 

                EGFR if Non- (test code=EGFRNA)    >60 mL/min/1.73m\S\2                    Estimated

 Glomerular Filtration Rate (eGFR) Reference Intervals Decision Points for 18 
years and older and average body mass:      >=60                  Does not 
exclude kidney disease. 30 - 59             Suggests moderate chronic kidney 
disease and                            indicates the need for further 
investigation                              including assessment of proteinuria 
and                                       cardiovascular factors. < 30          
    Usually indicates a need for referral for assessment                        
   and management of chronic kidney failure.





TROPONIN-I Quantitative2019-01-15 14:55:00* 





                Test Item       Value           Reference Range    Comments

 

                Troponin-I (test code=TROP)    <0.015 ng/ml    0.000-0.034     The 99th Percentile URL 

is 0.045 ng/mL for the Siemens [place] Troponin I.  The Joint European Society of 
Cardiology/American College of Cardiology (ESC/ACC) and the National Academy of 
Clinical Biochemistry Standards of Laboratory Practices (NACB) recommends that 
the diagnosis of AMI includes the presence of clinical history suggestive of 
Acute Coronary Syndrome (ACS) and a maximum concentration of cardiac troponin 
exceeding the 99th percentile of a normal reference population [upper reference 
limit (URL)] on at least one occasion during the first 24 hours after the 
clinical event.   





PRO-BNP(B-Type Natriuretic Peptide)2019-01-15 14:55:00* 





                Test Item       Value           Reference Range    Comments

 

                Pro-BNP(B-Peptide) (test code=PROBNP)    810 pg/ml       0-450            





ABGs2019-01-15 14:44:00* 





                Test Item       Value           Reference Range    Comments

 

                pH (ABG) (test code=BGPH)    7.325           7.350-7.450      

 

                pCO2(T) (test code=BGPCO2(T))    50.9 mm Hg      35.0-45.0        

 

                pO2(T) (test code=BGPO2(T))    117.3 mm Hg     80.0-100.0       

 

                sO2 (test code=BGSO2)    98.5 %          90.0-99.0        

 

                Na+ (test code=BGCNA+)    138.3 mmol/l    135.0-148.0      

 

                K+ (test code=BGCK+)    3.80 mmol/l     3.50-4.50        

 

                Ca2+ (test code=BGCCA2+)    1.250 mmol/l    1.120-1.320      

 

                Cl- (test code=BGCCL-)    98 mmol/l                  

 

                tHb (test code=BGCTHB)    13.2 gm/dl      11.5-17.4        

 

                Hct (test code=BGHCT)    44.7            35.0-50.0        

 

                O2Hb (test code=FREY0GK)    >95.0 %         95.0-99.0        

 

                COHb (test code=BGFCOHB)    <2.80 %         0.5-2.5          

 

                MetHB (test code=BGMETHB)    <1.30 %         0.4-1.5          

 

                Gluc (test code=BGCGLU)    136.0 mg/dl     60.0-110.0       

 

                Lac (test code=BGCLAC)    <1.60 mmol/l    1.0-1.7          

 

                Barometric Pressure (test code=BGBARO)    770.3 mm Hg     450.0-1000.0     

 

                BE(act) (test code=BGBEACT)    -1 mmol/l                        

 

                HCO3 (test code=BGHCO3)    24 meq/L        22-26            

 

                ctCO2(B) (test code=BGCTCO2(B))    23 mmol/l                        

 

                FIO2 (fO2(I) (test code=BGFIO2)    0.30 %                           

 

                Drawn By (test code=BGDRAWNBY)    YENIFER CHRISTOPHER                     

 

                Collection Date (test code=BGDTCOL)    01/15/2019                       

 

                Collection Time (test code=BGCTM)    13:56                            

 

                Sample Site (test code=BGSAMPLESITE)    L Brachial                       

 

                Sample Type (test code=BGSAMPLETYPE)    Arterial                         

 

                Allens Test (test code=BGALLEN)    Acceptable                       

 

                Notified By (test code=BGNOTIFIEDBY)    YENIFER CHRISTOPHER                     

 

                Notified Whom (test code=BGNOTIFIEDWHOM)    Dr Ordoñez                        

 

                Date Notified (test code=BGDTNOTIFIED)    01/15/2019                       

 

                Time Notified (test code=BGTMNOTIFIED)    14:02                            

 

                O2 Device (test code=GOH1OAN2)    BIPAP                            

 

                Instrument ID (test code=BGINSTRID)    [id number]                            

 

                Reported By (test code=BGREPORTEDBY)    YENIFER CHRISTOPHER                     





PT AND INR2019-01-15 14:39:00* 





                Test Item       Value           Reference Range    Comments

 

                Protime (test code=PT)    10.0 seconds    9.0-11.8         

 

                INR (test code=INR)    1.0             0.9-1.1         INR results are intended ONLY to monitor Oral

 Anticoagulant therapy in stablized patients.  The INR Therapeutic Range is 2.0 
- 3.0 Patients with a mechanical heart, the INR Range is 2.5 - 3.5





XR CHEST AP/PA 1 VIEW2019-01-15 14:33:12Portable chest January 15, 2019 at 1409 
hrs.History: DyspneaCompared to 2019. Hyperinflation is again noted.
The lungs are clearof infiltrates. No vascular congestion is noted. Cardiac size
is within normallimits. The bony thorax is intact.Impression: Stable chest. 
Hyperinflation is again noted. There is no acuteabnormality.This final report 
was electronically signed by Dr Eldon Lux MD 1/15/30838:27 PMDictated By: 
ELDON LUXDate:  01/15/2019 14:27CBC WITH AUTO DIFF2019-01-15 14:21:00* 





                Test Item       Value           Reference Range    Comments

 

                WBC (test code=WBC)    11.80 10\S\3/ul    4.80-10.80       

 

                RBC (test code=RBC)    4.58 10\S\6/ul    4.20-5.40        

 

                Hemoglobin (test code=HGB)    13.3 gm/dl      12.0-14.0        

 

                Hematocrit (test code=HCT)    41.8 %          37.0-47.0        

 

                MCV (test code=MCV)    91.3 fL         81.0-99.0        

 

                MCH (test code=MCH)    29.0 pg         27.0-31.0        

 

                MCHC (test code=MCHC)    31.8 gm/dl      33.0-37.0        

 

                RDW (test code=RDWVC)    14.4 %          11.5-14.5        

 

                Platelet (test code=PLT)    292 10\S\3/ul    130-400          

 

                MPV (test code=MPV)    11.3 fL         7.4-10.4         

 

                NE% (test code=NE)    79.9 %          42.0-75.0        

 

                LY% (test code=LY)    11.2 %          13.0-42.0        

 

                MO% (test code=MO)    8.2 %           4.0-14.0         

 

                EO% (test code=EO)    0.1 %           1.0-5.0          

 

                BA% (test code=BA)    0.3 %           0.0-3.0          

 

                IG% (test code=IG%)    0.3 %           0.0-0.4          





PRO-BNP(B-Type Natriuretic Peptide)2019 18:42:00* 





                Test Item       Value           Reference Range    Comments

 

                Pro-BNP(B-Peptide) (test code=PROBNP)    1217 pg/ml      0-450           Result called to Minoo Mina RN-ER read back





[id number] 18:40:00* 





                Test Item       Value           Reference Range    Comments

 

                Glucose (test code=GLU)    111 mg/dl                  

 

                BUN (test code=BUN)    14.0 mg/dl      6.0-17.0         

 

                Creatinine (test code=CREA)    0.9 mg/dl       0.4-1.2          

 

                Sodium (test code=NA)    136 mmol/l      137-145          

 

                Potassium (test code=K)    3.7 mmol/l      3.5-5.0          

 

                Chloride (test code=CL)    101 mmol/l                 

 

                CO2 (test code=CO2)    27 mmol/l       22-30            

 

                Calcium (test code=CALC)    8.6 mg/dl       8.4-10.2         

 

                T Protein (test code=TP)    7.6 gm/dl       5.1-8.7          

 

                Albumin (test code=ALB)    3.3 gm/dl       3.5-4.6          

 

                A/G Ratio (test code=AGRAT)    0.8 %           1.1-2.2          

 

                AST (SGOT) (test code=AST)    22 U/L          11-36            

 

                ALT (SGPT) (test code=ALT)    13 U/L          11-40            

 

                Alkaline Phos (test code=ALKP)    86 U/L                     

 

                Total Bilirubin (test code=TBIL)    0.2 mg/dl       0.2-1.2          

 

                Globulin (test code=GLOBU)    4.3 gm/dl       2.3-3.5          

 

                Calcium, Corrected (test code=CALCCORR)    9.2 mg/dl       8.4-10.2        Various formulas exist

 for corrected serum calcium results, each yielding different values.  This 
corrected result was based on the formula: Corrected Calcium=SerumCalcium + [0.8
 * ( 4 - SerumAlbumin)] 

 

                EGFR if  (test code=EGFRAA)    >60 mL/min/1.73m\S\2                     

 

                EGFR if Non- (test code=EGFRNA)    >60 mL/min/1.73m\S\2                    Estimated

 Glomerular Filtration Rate (eGFR) Reference Intervals Decision Points for 18 
years and older and average body mass:      >=60                  Does not 
exclude kidney disease. 30 - 59             Suggests moderate chronic kidney 
disease and                            indicates the need for further 
investigation                              including assessment of proteinuria 
and                                       cardiovascular factors. < 30          
    Usually indicates a need for referral for assessment                        
   and management of chronic kidney failure.





CBC WITH AUTO [id number] 18:17:00* 





                Test Item       Value           Reference Range    Comments

 

                WBC (test code=WBC)    7.15 10\S\3/ul    4.80-10.80       

 

                RBC (test code=RBC)    4.11 10\S\6/ul    4.20-5.40        

 

                Hemoglobin (test code=HGB)    12.2 gm/dl      12.0-14.0        

 

                Hematocrit (test code=HCT)    37.5 %          37.0-47.0        

 

                MCV (test code=MCV)    91.2 fL         81.0-99.0        

 

                MCH (test code=MCH)    29.7 pg         27.0-31.0        

 

                MCHC (test code=MCHC)    32.5 gm/dl      33.0-37.0        

 

                RDW (test code=RDWVC)    14.6 %          11.5-14.5        

 

                Platelet (test code=PLT)    234 10\S\3/ul    130-400          

 

                MPV (test code=MPV)    10.4 fL         7.4-10.4        "NOT MEASURED" RESULTS ARE DISPLAYED WHEN

 THE INSTRUMENT HAS A SUPPRESSED OR UNREPORTABLE RESULT.  THIS WILL MOST OFTEN 
HAPPEN WITH THE MPV WHEN THERE IS AN ABNORMAL PLATELET DISTRIBUTION DUE TO A CR
ITICAL LOW VALUE OR PLATELET CLUMPING.  THE RDW MAY BE SUPPRESSED IF THERE ARE 
MULTIPLE PEAKS PRESENT ON THE RBC HISTOGRAM.  IN THIS CASE, A MANUAL REVIEW OF 
THE SLIDE WILL BE PERFORMED, AND RBC MORPHOLOGY WILL BE NOTED ON THE REPORT.

 

                NE% (test code=NE)    70.7 %          42.0-75.0        

 

                LY% (test code=LY)    12.4 %          13.0-42.0        

 

                MO% (test code=MO)    15.7 %          4.0-14.0         

 

                EO% (test code=EO)    0.1 %           1.0-5.0          

 

                BA% (test code=BA)    0.7 %           0.0-3.0          

 

                IG% (test code=IG%)    0.4 %           0.0-0.4          





XR CHEST 2 PA [id number] 14:05:35PA and lateral chest:Exam Date:  
2019History:  COPD, wheezingCompared to 2018. The lungs are 
again hyperinflated but appearclear. Cardiac size is within normal limits. Mild 
atherosclerotic calcificationin the aortic arch is again noted. There are no 
significant bony abnormalities.Impression: Stable chest. Hyperinflation with no 
acute cardiopulmonaryabnormalities identified.This final report was 
electronically signed by Dr Eldon Lux MD 20191:[id number] PMDictated By: 
Nino LUXte:  2019 13:59CT ANGIO CHEST W/WO or W/ CONT2018-10-16 
15:29:3320 g Cathlon Above the Antecubital or higher requi redCTA chest:History:
COPDCT angiography of the chest was performed using multidetector helical 
imagingfollowing bolus intravenous injection of 100 mL of Isovue-370. 
Imagepostprocessing was performed on the same workstation yielding coronal 3-D 
MIPimages and sagittal reconstructions. Dose reduction technique was employed 
usingautomated exposure control and adjustment of mA and/or kV according to juliana
entsize. Total  mGy-cm.No pulmonary embolus is identified. The thoracic a
jon and visualized upperabdominal aorta show no acute abnormality. Calcific ath
erosclerosis is present.The lungs are mild hyperinflated and are clear of infilt
rates. Moderateemphysematous changes are present, primarily in the upper lobes. 
There is nopleural or pericardial effusion. Sections through the visualized uppe
r abdomenshow no acute abnormality. The bony thorax is intact.Impression:No pulm
onary embolus or acute vascular abnormality is identified.Atherosclerosis and ch
anges of emphysema are noted.This final report was electronically signed by Dr MIRNA Lux MD 10/16/59680:23 PMDictated By: Yoav LUX:  10/16/201
8 15:23BLOOD UREA NTIROGEN (BUN)2018-10-16 14:26:00* 





                Test Item       Value           Reference Range    Comments

 

                BUN (test code=BUN)    12.0 mg/dl      6.0-17.0         





CREATININE, Serum2018-10-16 14:26:00* 





                Test Item       Value           Reference Range    Comments

 

                Creatinine (test code=CREA)    0.8 mg/dl       0.4-1.2          

 

                EGFR if  (test code=EGFRAA)    >60 mL/min/1.73m\S\2                     

 

                EGFR if Non- (test code=EGFRNA)    >60 mL/min/1.73m\S\2                    Estimated

 Glomerular Filtration Rate (eGFR) Reference Intervals Decision Points for 18 
years and older and average body mass:      >=60                  Does not 
exclude kidney disease. 30 - 59             Suggests moderate chronic kidney 
disease and                            indicates the need for further 
investigation                              including assessment of proteinuria 
and                                       cardiovascular factors. < 30          
    Usually indicates a need for referral for assessment                        
   and management of chronic kidney failure.





XR CHEST 2 PA LATERAL2018-10-04 15:12:02PA and lateral chest:History: 
DyspneaCompared to 2011. The lungs are hyperinflated and 
hyperlucent. Noinfiltrate or vascular congestion is noted. Cardiac size is 
within normallimits. Vascular calcification in the thoracic aorta is again 
noted. The bonythorax appears intact.Impression: Stable chest. Changes of 
emphysema with no acute abnormalityidentified.This final report was electro
nically signed by Dr Eldon Lux MD 10/4/21294:05 PMDictated By: LAYNE LUX
CHARDDate:  10/04/2018 15:05US VEINS BILAT LEGS Doppler2018-08-10 13:04:47
Bilateral lower extremity venous Doppler ultrasound:Exam Date:  8/10/2018Toribio redman Physician:  Dr Marialuisa OrlandonClinical Indication: Bilateral lower extremity swel
lingDuplex scanning, spectral analysis and real-time grayscale and color Doppler
imaging of the bilateral lower extremity venous system was performed.The deep ve
ins of both lower extremities were compressible. Spontaneous phasicflow and augm
entation was noted. No intraluminal thrombus was visualized. Thevisualized great
er saphenous vein appeared patent. No popliteal cyst isidentified.Impression: No
evidence of DVT.This final report was electronically signed by Dr Eldon hawkins MD 8/10/169971:58 PMDictated By: ELDON LUXDate:  08/10/2018 13:04

## 2019-04-23 NOTE — XMS REPORT
[facility] - Continuity of Care Document

                             Created on: 2019



FELIX GALICIA

External Reference #: [id number]

: 1944

Sex: Female



Demographics







                          Address                   [address] [place]  [address]

 

                          Home Phone                [phone]

 

                          Preferred Language        English

 

                          Marital Status            

 

                          Tem[employer] Affiliation     Unknown

 

                          Race                      White

 

                          Ethnic Group              Non-





Author







                          Author                    [facility]

 

                          Organization              [facility]

 

                          Address                   [address]

[place]  [address]



 

                          Phone                     [phone];ext=







Care Team Providers







                    Care Team Member Name    Role                Phone

 

                    ROC CHING    Admphys             [phone]

 

                    ROC CHING    Attphys             [phone]







Hospital Admission Diagnosis

* No data in the System





Social History







           Element Description    Code       Description    Smoking Status Code System    Start Date    

End Date

 

           Smoking Status    804020764    Never smoker    SNOMED-CT                







Problems

* No data in the system





Medications







                          SNOMED CT                 Description

 

                          960988210                 Drug Treatment Unknown







Allergies

* No Allergy Data in the System





Results

* No data in the system





Vital Signs

* No data in the system





Advance Directives





Patient does NOT have Living Will





             Directive Type    Effective Date         Notes        Supporting Document

 

                    Name                Address             Phone

 

             No Directive Type specified    2019 14:15    Not Specified    Not Specified    Not

 Specified                None                      No







Family History

* No Data Reported





Plan of Care

* No data in the system





Procedures

* No data in the system





Encounters

* No data in the system





Immunizations

* No data in the system





Functional Status

* No data in the system





Hospital Discharge Instructions

* No data in the system

## 2019-04-23 NOTE — XMS REPORT
[facility]

                             Created on: 2019



Chloe Brothersdanny

External Reference #: [id number]

: 1945

Sex: Female



Demographics







                          Address                   [address] [place]  [address]

 

                          Home Phone                [phone]

 

                          Preferred Language        Unknown

 

                          Marital Status            Unknown

 

                          [employer] Affiliation     Unknown

 

                          Race                      White

 

                          Ethnic Group              UNK





Author







                          Author                    Najjar, Husam

 

                          Organization              [employer]

 

                          Address                   Unknown

 

                          Phone                     Unavailable







Care Team Providers







                    Care Team Member Name    Role                Phone

 

                    Najjar, Husam      CP                  Unavailable



                                                                



Allergies

          No Known Allergies                                                    
                                   



Problems

          





             Problem Type    Condition    Code         Onset Dates    Condition Status

 

             Problem      COPD with chronic bronchitis    J44.9                     Active

 

                          Problem                   Rheumatoid arthritis involving multiple sites, unspecified rheumatoid factor

 presence           M06.9                                   Active

 

             Problem      Chronic respiratory failure with hypoxia    J96.11                    Active

 

             Assessment    Acute and chronic respiratory failure with hypoxia    J96.21                    Active



 

             Assessment    Acute and chronic respiratory failure with hypercapnia    J96.22                    Active



 

             Assessment    COPD with acute exacerbation    J44.1                     Active



                                                                                
                                                         



Medications

          





        Medication    Code System    Code    Instructions    Start Date    End Date    Status    Dosage



 

        Methotrexate    NDC     35489035095    2.5 MG Orally ONCE A WEEK                    Active    8 tabs

 

        Folic Acid    NDC     32986438753    1 MG Orally Once a day                    Active    1 tablet

 

        Aleve    ND     41466687525    220 MG Orally every 12 hrs                    Active    1 tablet with

 food or milk as needed

 

             Anoro Ellipta    NDC          99969983909    62.5-25 MCG/INH Inhalation Once a day    Oct 23, 

2018                2019        Active              1 puff

 

        Amlodipine Besylate    NDC     78573113784    10 MG Orally Once a day                    Active    1 

tablet

 

          Hydrocodone-Acetaminophen    NDC       74229604066    5-325 MG Orally ONCE A DAY                        Active

                                        1 tablet as needed

 

        Leucovorin Calcium    NDC     46683417319    5 MG Orally ONCE A WEEK                    Active    1 tablet



 

        Zolpidem Tartrate    NDC     06689501292    10 MG Orally Once a day                    Active    1 tablet

 at bedtime as needed

 

          Symbicort    NDC       34222754253    160-4.5 MCG/ACT Inhalation Twice a day                        Active

                                        2 puffs

 

                ProAir RespiClick    NDC             96245358882     108 (90 Base) MCG/ACT Inhalation every 6 hrs

                                                Active          2 puffs as needed

 

        Benazepril HCl    NDC     78750534736    40 MG Orally Once a day                    Active    1 tablet



 

           Levalbuterol HCl    NDC        70387443139    0.63 MG/3ML Inhalation every 8 hrs PRN                

                          Active                    3 ml



                                                                                
                                                                                
                          



Results

          No Known Results                                                      
             



Summary Purpose

          [employer] Submission

## 2019-04-23 NOTE — XMS REPORT
[facility]

                             Created on: 10/11/2018



FRIDA SUSI

External Reference #: [id number]

: 1945

Sex: Female



Demographics







                          Address                   [address] [place]  [address]

 

                          Home Phone                [phone]

 

                          Preferred Language        Unknown

 

                          Marital Status            Unknown

 

                          Congregation Affiliation     Unknown

 

                          Race                      Other Race

 

                          Ethnic Group              UNK





Author







                          Author                    Najjar, Husam

 

                          Organization              [employer]

 

                          Address                   Unknown

 

                          Phone                     Unavailable







Care Team Providers







                    Care Team Member Name    Role                Phone

 

                    Najjar, Husam      CP                  Unavailable



                                                                



Allergies

          No Known Allergies                                                    
                                   



Problems

          





             Problem Type    Condition    Code         Onset Dates    Condition Status

 

                          Problem                   Rheumatoid arthritis involving multiple sites, unspecified rheumatoid factor

 presence           M06.9                                   Active

 

             Problem      COPD with chronic bronchitis    J44.9                     Active

 

             Assessment    COPD with chronic bronchitis    J44.9                     Active



                                                                                
                           



Medications

          No Known Medications                                                  
                           



Results

          No Known Results                                                      
             



Summary Purpose

          [employer] Submission

## 2019-04-23 NOTE — XMS REPORT
[facility]

                             Created on: 10/10/2018



SUSI GALICIA

External Reference #: [id number]

: 1945

Sex: Female



Demographics







                          Address                   [address] [place]  [address]

 

                          Home Phone                [phone]

 

                          Preferred Language        Unknown

 

                          Marital Status            Unknown

 

                          [employer] Affiliation     Unknown

 

                          Race                      Other Race

 

                          Ethnic Group              UNK





Author







                          Author                    Najjar, Husam

 

                          Organization              [employer]

 

                          Address                   Unknown

 

                          Phone                     Unavailable







Care Team Providers







                    Care Team Member Name    Role                Phone

 

                    Najjar, Husam      CP                  Unavailable



                                                                



Allergies

          No Known Allergies                                                    
                                   



Problems

          





             Problem Type    Condition    Code         Onset Dates    Condition Status

 

                          Problem                   Rheumatoid arthritis involving multiple sites, unspecified rheumatoid factor

 presence           M06.9                                   Active

 

             Problem      COPD with chronic bronchitis    J44.9                     Active



                                                                                
                 



Medications

          





        Medication    Code System    Code    Instructions    Start Date    End Date    Status    Dosage



 

          Hydrochlorothiazide    NDC       [address]-60    12.5 MG Orally Once a day PRN                        Active

                                        1 capsule in the morning

 

        Folic Acid    NDC     70491295516    1 MG Orally Once a day                    Active    1 tablet

 

        Trazodone HCl    NDC     [address]-19    50 MG Orally at HS PRN                    Active    1-2 tablet



 

        Zolpidem Tartrate    NDC     77500543978    10 MG Orally Once a day                    Active    1 tablet

 at bedtime as needed

 

        Benazepril HCl    NDC     37555309938    40 MG Orally Once a day                    Active    1 tablet



 

        Etodolac    NDC     21297651889    400 MG Orally Twice a day                    Active    1 tablet with

 food

 

        Leucovorin Calcium    NDC     [address]-28    5 MG Orally ONCE A WEEK                    Active    1

 tablet

 

             Levalbuterol HCl    NDC          [address]-97    0.63 MG/3ML Inhalation every 8 hrs PRN      

                                        Active              3 ml

 

        Methotrexate    NDC     [address]-53    2.5 MG Orally ONCE A WEEK                    Active    6 tabs



 

          Symbicort    NDC       78313215207    160-4.5 MCG/ACT Inhalation Twice a day                        Active

                                        2 puffs

 

        Amlodipine Besylate    NDC     01496077412    10 MG Orally Once a day                    Active    1 

tablet

 

           Hydrocodone-Acetaminophen    NDC        [address]-21    5-325 MG Orally ONCE A DAY                 

                          Active                    1 tablet as needed



                                                                                
                                                                                
                          



Results

          No Known Results                                                      
             



Summary Purpose

          [employer] Submission

## 2019-04-23 NOTE — XMS REPORT
[facility]

                             Created on: 2019



James Brothers

External Reference #: [id number]

: 1945

Sex: Female



Demographics







                          Address                   [address] [place]  [address]

 

                          Home Phone                [phone]

 

                          Preferred Language        Unknown

 

                          Marital Status            Unknown

 

                          [employer] Affiliation     Unknown

 

                          Race                      White

 

                          Ethnic Group              UNK





Author







                          Author                    Najjar, Husam

 

                          Organization              [employer]

 

                          Address                   Unknown

 

                          Phone                     Unavailable







Care Team Providers







                    Care Team Member Name    Role                Phone

 

                    Najjar, Husam      CP                  Unavailable



                                                                



Allergies

          No Known Allergies                                                    
                                   



Problems

          





             Problem Type    Condition    Code         Onset Dates    Condition Status

 

             Problem      COPD with chronic bronchitis    J44.9                     Active

 

                          Problem                   Rheumatoid arthritis involving multiple sites, unspecified rheumatoid factor

 presence           M06.9                                   Active

 

             Problem      Chronic respiratory failure with hypoxia    J96.11                    Active



                                                                                
                           



Medications

          No Known Medications                                                  
                           



Results

          No Known Results                                                      
             



Summary Purpose

          [employer] Submission

## 2019-04-23 NOTE — XMS REPORT
[facility]

                             Created on: 2019



TIFFANY GALICIAELZBIETA

External Reference #: [id number]

: 1945

Sex: Female



Demographics







                          Address                   [address] [place]  [address]

 

                          Home Phone                [phone]

 

                          Preferred Language        Unknown

 

                          Marital Status            Unknown

 

                          [employer] Affiliation     Unknown

 

                          Race                      Other Race

 

                          Ethnic Group              UNK





Author







                          Author                    Najjar, Husam

 

                          [facility]              [employer]

 

                          Address                   Unknown

 

                          Phone                     Unavailable







Care Team Providers







                    Care Team Member Name    Role                Phone

 

                    Najjar, Husam      CP                  Unavailable



                                                                



Allergies

          No Known Allergies                                                    
                                   



Problems

          





             Problem Type    Condition    Code         Onset Dates    Condition Status

 

             Problem      COPD with chronic bronchitis    J44.9                     Active

 

                          Problem                   Rheumatoid arthritis involving multiple sites, unspecified rheumatoid factor

 presence           M06.9                                   Active

 

             Problem      Chronic respiratory failure with hypoxia    J96.11                    Active

 

             Assessment    Acute and chronic respiratory failure with hypoxia    J96.21                    Active



 

             Assessment    Acute and chronic respiratory failure with hypercapnia    J96.22                    Active



 

             Assessment    COPD with acute exacerbation    J44.1                     Active



                                                                                
                                                         



Medications

          





        Medication    Code System    Code    Instructions    Start Date    End Date    Status    Dosage



 

        Leucovorin Calcium    NDC     29346760125    5 MG Orally ONCE A WEEK                    Active    1 tablet



 

        Zolpidem Tartrate    NDC     63469261908    10 MG Orally Once a day                    Active    1 tablet

 at bedtime as needed

 

                ProAir RespiClick    NDC             90934984805     108 (90 Base) MCG/ACT Inhalation every 6 hrs

                                                Active          2 puffs as needed

 

        Amlodipine Besylate    NDC     28906381664    10 MG Orally Once a day                    Active    1 

tablet

 

          Hydrocodone-Acetaminophen    NDC       40799559869    5-325 MG Orally ONCE A DAY                        Active

                                        1 tablet as needed

 

          Symbicort    NDC       05540441022    160-4.5 MCG/ACT Inhalation Twice a day                        Active

                                        2 puffs

 

        Methotrexate    NDC     40150143003    2.5 MG Orally ONCE A WEEK                    Active    8 tabs

 

        Benazepril HCl    NDC     65894000215    40 MG Orally Once a day                    Active    1 tablet



 

             Anoro Ellipta    NDC          48829730575    62.5-25 MCG/INH Inhalation Once a day    Oct 23, 

2018                2019        Active              1 puff

 

        Aleve    ND     95275748747    220 MG Orally every 12 hrs                    Active    1 tablet with

 food or milk as needed

 

        Folic Acid    NDC     91531183060    1 MG Orally Once a day                    Active    1 tablet

 

           Levalbuterol HCl    NDC        53572244864    0.63 MG/3ML Inhalation every 8 hrs PRN                

                          Active                    3 ml



                                                                                
                                                                                
                          



Results

          No Known Results                                                      
             



Summary Purpose

          [employer] Submission

## 2019-04-23 NOTE — XMS REPORT
[facility]

                             Created on: 2019



TIFFANY GALICIAELZBIETA

External Reference #: [id number]

: 1945

Sex: Female



Demographics







                          Address                   [address] [place]  [address]

 

                          Home Phone                [phone]

 

                          Preferred Language        Unknown

 

                          Marital Status            Unknown

 

                          Protestant Affiliation     Unknown

 

                          Race                      Other Race

 

                          Ethnic Group              UNK





Author







                          Author                    Najjar, Husam

 

                          [facility]              eClinicalWorks

 

                          Address                   Unknown

 

                          Phone                     Unavailable







Care Team Providers







                    Care Team Member Name    Role                Phone

 

                    Najjar, Husam      CP                  Unavailable



                                                                



Allergies

          No Known Allergies                                                    
                                   



Problems

          





             Problem Type    Condition    Code         Onset Dates    Condition Status

 

             Assessment    Anxiety      F41.9                     Active

 

             Problem      COPD with chronic bronchitis    J44.9                     Active

 

                          Problem                   Rheumatoid arthritis involving multiple sites, unspecified rheumatoid factor

 presence           M06.9                                   Active

 

             Problem      Chronic respiratory failure with hypoxia    J96.11                    Active

 

             Assessment    Acute and chronic respiratory failure with hypoxia    J96.21                    Active



 

             Assessment    Acute and chronic respiratory failure with hypercapnia    J96.22                    Active



 

             Assessment    COPD with acute exacerbation    J44.1                     Active



                                                                                
                                                                   



Medications

          





        Medication    Code System    Code    Instructions    Start Date    End Date    Status    Dosage



 

        Leucovorin Calcium    NDC     40609759404    5 MG Orally ONCE A WEEK                    Active    1 tablet



 

          Hydrocodone-Acetaminophen    NDC       38107910243    5-325 MG Orally ONCE A DAY                        Active

                                        1 tablet as needed

 

             Anoro Ellipta    NDC          63630952260    62.5-25 MCG/INH Inhalation Once a day    Oct 23, 

2018                2019        Active              1 puff

 

                ProAir RespiClick    NDC             99677338519     108 (90 Base) MCG/ACT Inhalation every 6 hrs

                                                Active          2 puffs as needed

 

        Benazepril HCl    NDC     42819369783    40 MG Orally Once a day                    Active    1 tablet



 

        Folic Acid    NDC     70283680081    1 MG Orally Once a day                    Active    1 tablet

 

           Levalbuterol HCl    NDC        44811042161    0.63 MG/3ML Inhalation every 8 hrs PRN                

                          Active                    3 ml

 

        Zolpidem Tartrate    NDC     28720957123    10 MG Orally Once a day                    Active    1 tablet

 at bedtime as needed

 

        Aleve    ND     58102241257    220 MG Orally every 12 hrs                    Active    1 tablet with

 food or milk as needed

 

          Symbicort    NDC       59054499593    160-4.5 MCG/ACT Inhalation Twice a day                        Active

                                        2 puffs

 

        Amlodipine Besylate    NDC     37828097917    10 MG Orally Once a day                    Active    1 

tablet

 

        Methotrexate    NDC     32408605029    2.5 MG Orally ONCE A WEEK                    Active    8 tabs



                                                                                
                                                                                
                          



Results

          No Known Results                                                      
             



Summary Purpose

          eClinicalWorks Submission

## 2019-04-23 NOTE — XMS REPORT
[facility] - Continuity of Care Document

                             Created on: 2018



MICHAEL GALICIA

External Reference #: [id number]

: 1945

Sex: Female



Demographics







                          Address                   [address] [place]  [address]

 

                          Home Phone                [phone]

 

                          Preferred Language        English

 

                          Marital Status            

 

                          Episcopalian Affiliation     Unknown

 

                          Race                      White

 

                          Ethnic Group              Non-





Author







                          Author                    [facility]

 

                          Address                   [address]

[place]  [address]



 

                          Phone                     [phone];ext=







Support







                Name            Relationship    Address         Phone

 

                    KRISTI GALICIA    ECON                [address] [place]  [address] [phone]







Care Team Providers







                    Care Team Member Name    Role                Phone

 

                    DR NEIL BOUCHER             [phone]

 

                    DR NEIL BOUCHER    [place]             [phone]







Hospital Admission Diagnosis







                    Code                Admission Diagnosis    Date

 

                    136864873           Coag./bleeding tests abnormal     







Social History







           Element Description    Code       Description    Smoking Status Code System    Start Date    

End Date

 

           Smoking Status    6034937    Former smoker    SNOMED-CT                







Problems

* No data in the system

        



Medications

  





                          SNOMED CT                 Description 

 

                          521416397                                     Drug Treatment Unknown              

                



                   



Allergies

* No Allergy Data in the System





Results





******** Radiology Results ********







Order: [id number] US VEINS BILAT LEGS Doppler* Exam Completion Date:08/10/2018 
  11:14





Bilateral lower extremity venous Doppler ultrasound:Exam Date:  8/10/2018Toribio redman Physician:  Dr Neil OrlandonClinical Indication: Bilateral lower extremity swel
lingDuplex scanning, spectral analysis and real-time grayscale and color Doppler
imaging of the bilateral lower extremity venous system was performed.The deep ve
ins of both lower extremities were compressible. Spontaneous phasicflow and augm
entation was noted. No intraluminal thrombus was visualized. Thevisualized great
er saphenous vein appeared patent. No popliteal cyst isidentified.Impression: No
evidence of DVT.This final report was electronically signed by Dr Liss hawkins MD 8/10/656455:58 PMDictated By: LISS LUXDate:  08/10/2018 13:04



        



Vital Signs

* No data in the system





Plan of Care

* No data in the system





Procedures







             Code         Code System    Procedure Name    Target Site    Date of Procedure

 

                                       US VEINS BILAT LEGS Doppler                 08/10/2018 13:04



        



Encounters







                Date            Code            Diagnosis       Status 

 

                    (ICD10) - R791      ABNORMAL COAGULATION PROFILE    Active







Immunizations

                    * No data in the system

                                           



Functional Status

* No data in the system





Hospital Discharge Instructions

* No data in the system

## 2019-04-23 NOTE — XMS REPORT
[facility] - Continuity of Care Document

                             Created on: 10/18/2018



SUSI GALICIA

External Reference #: [id number]

: 1945

Sex: Female



Demographics







                          Address                   [address] [place]  [address]

 

                          Home Phone                [phone]

 

                          Preferred Language        English

 

                          Marital Status            

 

                          Temple Affiliation     Unknown

 

                          Race                      White

 

                          Ethnic Group              Non-





Author







                          Author                    [facility]

 

                          Organization              [facility]

 

                          Address                   [address]

[place]  [address]



 

                          Phone                     [phone];ext=







Support







                Name            Relationship    Address         Phone

 

                    KRISTI GALICIA    ECON                [address] [place]  [address] [phone]







Care Team Providers







                    Care Team Member Name    Role                Phone

 

                    DR MARIALUISA BOUCHER    Admvani             [phone]

 

                    DR MARIALUISA BOUCHER    Attvani             [phone]







Hospital Admission Diagnosis







                    Code                Admission Diagnosis    Date

 

                    [id number]           Dyspnea              







Social History







           Element Description    Code       Description    Smoking Status Code System    Start Date    

End Date

 

           Smoking Status    2398138    Former smoker    SNOMED-CT                







Problems

* No data in the system





Medications







                          SNOMED CT                 Description

 

                          472979345                 Drug Treatment Unknown







Allergies

* No Allergy Data in the System





Results





******** Radiology Results ********







Order: [id number] XR CHEST 2 PA LATERAL* Exam Completion Date:10/04/2018 14:15





PA and lateral chest:History: DyspneaCompared to 2011. The lungs a
re hyperinflated and hyperlucent. Noinfiltrate or vascular congestion is noted. 
Cardiac size is within normallimits. Vascular calcification in the thoracic aort
a is again noted. The bonythorax appears intact.Impression: Stable chest. Change
s of emphysema with no acute abnormalityidentified.This final report was electro
nically signed by Dr Eldon Parson MD 10/4/47232:05 PMDictated By: LAYNE PARSON
CHARLEXIate:  10/04/2018 15:05







Vital Signs

* No data in the system





Advance Directives





YES LIVING WILL





             Directive Type    Effective Date         Notes        Supporting Document

 

                    Name                Address             Phone

 

             No Directive Type specified    2013 21:27    Not Specified    Not Specified    Not

 Specified                None                      No







Patient does NOT have Living Will





             Directive Type    Effective Date         Notes        Supporting Document

 

                    Name                Address             Phone

 

             No Directive Type specified    08/10/2018 11:03    Not Specified    Not Specified    Not

 Specified                None                      No







Family History

* No Data Reported





Plan of Care

* No data in the system





Procedures







             Code         Code System    Procedure Name    Target Site    Date of Procedure

 

                                       XR CHEST 2 PA LATERAL                 10/04/2018 15:12







Encounters







                Date            Code            Diagnosis       Status

 

                                (ICD10) -     OTHER FORMS OF DYSPNEA    Active







Immunizations

* No data in the system





Functional Status

* No data in the system





Hospital Discharge Instructions

* No data in the system

## 2019-04-23 NOTE — XMS REPORT
[facility] - Continuity of Care Document

                             Created on: 10/18/2018



GALICIASUSI CEDRIC

External Reference #: [id number]

: 1945

Sex: Female



Demographics







                          Address                   [address] [place]  [address]

 

                          Home Phone                [phone]

 

                          Preferred Language        English

 

                          Marital Status            

 

                          Baptism Affiliation     Unknown

 

                          Race                      White

 

                          Ethnic Group              Non-





Author







                          Author                    [facility]

 

                          Address                   [address] [place]  [address]



 

                          Phone                     [phone];ext=







Support







                Name            Relationship    Address         Phone

 

                    KRISTI GALICIA    ECON                [address] [place]  [address] [phone]







Care Team Providers







                    Care Team Member Name    Role                Phone

 

                    NAJJAR, HUSAM       Admvani             [phone]

 

                    NAJJARVICENTA ACEVEDO       [employer]             [phone]







Hospital Admission Diagnosis







                    Code                Admission Diagnosis    Date

 

                    [id number]            Chronic obstructive lung disease     







Social History







           Element Description    Code       Description    Smoking Status Code System    Start Date    

End Date

 

           Smoking Status    2092886    Former smoker    SNOMED-CT                







Problems

* No data in the system





Medications







                          SNOMED CT                 Description

 

                          852054013                 Drug Treatment Unknown







Allergies

* No Allergy Data in the System





Results





******** Laboratory Results ********







Order: BUN







Specimen Source: BLOOD 







Body Site:  









          LOINC     Test      Result    Flag      Range     Unit      Date

 

                    1BUN      12                  6.0-17.0    mg/dl     10/16/2018 13:04



* Performing Lab Footnotes:* [address] [place] [address] - [id number] [facility] [address] [place] [address] JON Johnson MD: DIRECTOR CHIQUIS GERARDO







________________________________________________________________________________
______













Order: CREATININE SERUM







Specimen Source: BLOOD 







Body Site:  









          LOINC     Test      Result    Flag      Range     Unit      Date

 

                    1Creatinine    0.8                 0.4-1.2    mg/dl     10/16/2018 13:04

 

                    1EGFR if     >60                           mL/min/1.73m^2    10/16/2018 13:04

 

                    1EGFR if Non-    >60                           mL/min/1.73m^2    10/16/2018 13:04

 

                                        Note:               Estimated Glomerular Filtration Rate (eGFR) Reference Intervals

Decision Points for 18 years and older and average body mass:

>=60                  Does not exclude kidney disease.

                                        30 - 59             Suggests moderate chronic kidney disease and

indicates the need for further investigation

including assessment of proteinuria and

cardiovascular factors.

< 30               Usually indicates a need for referral for assessment

and management of chronic kidney failure. 



* Performing Lab Footnotes:* [address] [place] [address] - [id number] - 
  [address] [facility] [address] -  MD: DIRECTOR CHIQUIS GERARDO







________________________________________________________________________________
______









******** Radiology Results ********







Order: [id number] CT ANGIO CHEST W/WO or W/ CONT* Exam Completion Date:10/16/2018 
  13:00





CTA chest:History: COPDCT angiography of the chest was performed using multidete
ctor helical imagingfollowing bolus intravenous injection of 100 mL of Isovue-37
0. Imagepostprocessing was performed on the same workstation yielding coronal 3-
D MIPimages and sagittal reconstructions. Dose reduction technique was employed 
usingautomated exposure control and adjustment of mA and/or kV according to juliana
entsize. Total  mGy-cm.No pulmonary embolus is identified. The thoracic a
jon and visualized upperabdominal aorta show no acute abnormality. Calcific ath
erosclerosis is present.The lungs are mild hyperinflated and are clear of infilt
rates. Moderateemphysematous changes are present, primarily in the upper lobes. 
There is nopleural or pericardial effusion. Sections through the visualized uppe
r abdomenshow no acute abnormality. The bony thorax is intact.Impression:   No p
ulmonary embolus or acute vascular abnormality is identified.Atherosclerosis and
changes of emphysema are noted.This final report was electronically signed by Dr Eldon Lux MD 10/16/00380:23 PMDictated By: Nino LUXte:  10/16/
2018 15:23







Vital Signs

* No data in the system





Advance Directives





YES LIVING WILL





             Directive Type    Effective Date         Notes        Supporting Document

 

                    Name                Address             Phone

 

             No Directive Type specified    2013 21:27    Not Specified    Not Specified    Not

 Specified                None                      No







Patient does NOT have Living Will





             Directive Type    Effective Date         Notes        Supporting Document

 

                    Name                Address             Phone

 

             No Directive Type specified    08/10/2018 11:03    Not Specified    Not Specified    Not

 Specified                None                      No







Family History

* No Data Reported





Plan of Care

* No data in the system





Procedures







             Code         Code System    Procedure Name    Target Site    Date of Procedure

 

                                       CT ANGIO CHEST W/WO or W/ CONT                 10/16/2018 15:29







Encounters







                Date            Code            Diagnosis       Status

 

                                (ICD10) - J449    COPD UNSPECIFIED    Active







Immunizations

* No data in the system





Functional Status

* No data in the system





Hospital Discharge Instructions

* No data in the system

## 2019-04-23 NOTE — DIAGNOSTIC IMAGING REPORT
EXAMINATION: CT scan of the chest  with contrast.



TECHNIQUE: Helical CT images of the chest were performed from the lung apices

to the level of the adrenal glands after the intravenous administration of 100

cc of Isovue 300.  Coronal and sagittal reformatted images were obtained.Dose

modulation, iterative reconstruction, and/or weight based adjustment of the

mA/kV was utilized to reduce the radiation dose to as low as reasonably

achievable. 



COMPARISON: None.



CLINICAL HISTORY:Chest pain



DISCUSSION:



LINES/TUBES:  None.



LUNGS AND AIRWAYS:  The lungs are hyperinflated with emphysematous change. Mild

reticulations at the lung bases. Scattered Tree-in-bud nodularity in the right

lower lung image 64 and 53 for example.  



No pulmonary embolism.



PLEURA: No pneumothorax or pleural effusions.  



HEART AND MEDIASTINUM: The thyroid gland is normal.   The heart and pericardium

are within normal limits.



LYMPH NODES: There is no mediastinal, hilar or axillary lymphadenopathy.



ABDOMEN: Limited contrast-enhanced views of the upper abdomen show no

abnormality within the visualized liver, spleen, pancreas, or kidneys. The

adrenal glands are normal.



BONES AND SOFT TISSUES: No acute bony abnormalities.



IMPRESSION: 



No pulmonary embolism.



Pulmonary edema.



Scattered tree-in-bud nodularity predominantly in the right lower lung may

reflect infectious bronchiolitis or aspiration.



Signed by: Dr. Andrew Palisch, M.D. on 4/23/2019 5:49 PM

## 2019-04-23 NOTE — XMS REPORT
[facility]

                             Created on: 2019



TIFFANY GALICIAELZBIETA

External Reference #: [id number]

: 1945

Sex: Female



Demographics







                          Address                   [address] [place]  [address]

 

                          Home Phone                [phone]

 

                          Preferred Language        Unknown

 

                          Marital Status            Unknown

 

                          Sikhism Affiliation     Unknown

 

                          Race                      Other Race

 

                          Ethnic Group              UNK





Author







                          Author                    Najjar, Husam

 

                          Organization              [employer]

 

                          Address                   Unknown

 

                          Phone                     Unavailable







Care Team Providers







                    Care Team Member Name    Role                Phone

 

                    Najjar, Husam      CP                  Unavailable



                                                                



Allergies

          No Known Allergies                                                    
                                   



Problems

          





             Problem Type    Condition    Code         Onset Dates    Condition Status

 

             Assessment    Anxiety      F41.9                     Active

 

             Problem      COPD with chronic bronchitis    J44.9                     Active

 

                          Problem                   Rheumatoid arthritis involving multiple sites, unspecified rheumatoid factor

 presence           M06.9                                   Active

 

             Problem      Chronic respiratory failure with hypoxia    J96.11                    Active

 

             Assessment    Acute and chronic respiratory failure with hypoxia    J96.21                    Active



 

             Assessment    Acute and chronic respiratory failure with hypercapnia    J96.22                    Active



 

             Assessment    COPD with acute exacerbation    J44.1                     Active



                                                                                
                                                                   



Medications

          





        Medication    Code System    Code    Instructions    Start Date    End Date    Status    Dosage



 

        Folic Acid    NDC     87037285417    1 MG Orally Once a day                    Active    1 tablet

 

        Amlodipine Besylate    NDC     58602140800    10 MG Orally Once a day                    Active    1 

tablet

 

           Levalbuterol HCl    NDC        96662480636    0.63 MG/3ML Inhalation every 8 hrs PRN                

                          Active                    3 ml

 

        Methotrexate    NDC     43446541653    2.5 MG Orally ONCE A WEEK                    Active    8 tabs

 

        Zolpidem Tartrate    NDC     30719421604    10 MG Orally Once a day                    Active    1 tablet

 at bedtime as needed

 

        Benazepril HCl    NDC     07477056824    40 MG Orally Once a day                    Active    1 tablet



 

                ProAir RespiClick    NDC             50880902033     108 (90 Base) MCG/ACT Inhalation every 6 hrs

                                                Active          2 puffs as needed

 

        Leucovorin Calcium    NDC     13968367078    5 MG Orally ONCE A WEEK                    Active    1 tablet



 

          Hydrocodone-Acetaminophen    NDC       45825642953    5-325 MG Orally ONCE A DAY                        Active

                                        1 tablet as needed

 

          Symbicort    NDC       43314492544    160-4.5 MCG/ACT Inhalation Twice a day                        Active

                                        2 puffs

 

        Aleve    ND     95674071967    220 MG Orally every 12 hrs                    Active    1 tablet with

 food or milk as needed

 

             Anoro Ellipta    NDC          40032435939    62.5-25 MCG/INH Inhalation Once a day    Oct 23, 

2018                2019        Active              1 puff



                                                                                
                                                                                
                          



Results

          No Known Results                                                      
             



Summary Purpose

          [employer] Submission

## 2019-04-23 NOTE — XMS REPORT
[facility]

                             Created on: 2019



TIFFANY GALICIAELZBIETA

External Reference #: [id number]

: 1945

Sex: Female



Demographics







                          Address                   [address] [place]  [address]

 

                          Home Phone                [phone]

 

                          Preferred Language        Unknown

 

                          Marital Status            Unknown

 

                          Evangelical Affiliation     Unknown

 

                          Race                      Other Race

 

                          Ethnic Group              UNK





Author







                          Author                    Najjar, Husam

 

                          Organization              [employer]

 

                          Address                   Unknown

 

                          Phone                     Unavailable







Care Team Providers







                    Care Team Member Name    Role                Phone

 

                    Najjar, Husam      CP                  Unavailable



                                                                



Allergies

          No Known Allergies                                                    
                                   



Problems

          





             Problem Type    Condition    Code         Onset Dates    Condition Status

 

             Problem      COPD with chronic bronchitis    J44.9                     Active

 

                          Problem                   Rheumatoid arthritis involving multiple sites, unspecified rheumatoid factor

 presence           M06.9                                   Active

 

             Problem      Chronic respiratory failure with hypoxia    J96.11                    Active

 

             Assessment    Acute and chronic respiratory failure with hypoxia    J96.21                    Active



 

             Assessment    Acute and chronic respiratory failure with hypercapnia    J96.22                    Active



 

             Assessment    COPD with acute exacerbation    J44.1                     Active



                                                                                
                                                         



Medications

          





        Medication    Code System    Code    Instructions    Start Date    End Date    Status    Dosage



 

        Aleve    ND     98728719523    220 MG Orally every 12 hrs                    Active    1 tablet with

 food or milk as needed

 

        Zolpidem Tartrate    NDC     39239293382    10 MG Orally Once a day                    Active    1 tablet

 at bedtime as needed

 

           Levalbuterol HCl    NDC        33595397575    0.63 MG/3ML Inhalation every 8 hrs PRN                

                          Active                    3 ml

 

        Leucovorin Calcium    NDC     71983752469    5 MG Orally ONCE A WEEK                    Active    1 tablet



 

          Symbicort    NDC       65396351902    160-4.5 MCG/ACT Inhalation Twice a day                        Active

                                        2 puffs

 

             Anoro Ellipta    NDC          14652240387    62.5-25 MCG/INH Inhalation Once a day    Oct 23, 

2018                2019        Active              1 puff

 

                ProAir RespiClick    NDC             01048270513     108 (90 Base) MCG/ACT Inhalation every 6 hrs

                                                Active          2 puffs as needed

 

        Benazepril HCl    NDC     49082835979    40 MG Orally Once a day                    Active    1 tablet



 

        Folic Acid    NDC     71459525905    1 MG Orally Once a day                    Active    1 tablet

 

          Hydrocodone-Acetaminophen    NDC       95173174302    5-325 MG Orally ONCE A DAY                        Active

                                        1 tablet as needed

 

        Methotrexate    NDC     06965352915    2.5 MG Orally ONCE A WEEK                    Active    8 tabs

 

        Amlodipine Besylate    NDC     02945267733    10 MG Orally Once a day                    Active    1 

tablet



                                                                                
                                                                                
                          



Results

          No Known Results                                                      
             



Summary Purpose

          [employer] Submission

## 2019-04-23 NOTE — XMS REPORT
[facility]

                             Created on: 2019



FRIDA SUSI

External Reference #: [id number]

: 1945

Sex: Female



Demographics







                          Address                   [address] [place] [address]

 

                          Home Phone                [phone]

 

                          Preferred Language        Unknown

 

                          Marital Status            Unknown

 

                          Scientology Affiliation     Unknown

 

                          Race                      Other Race

 

                          Ethnic Group              UNK





Author







                          Author                    Najjar, Husam

 

                          Organization              [employer]

 

                          Address                   Unknown

 

                          Phone                     Unavailable







Care Team Providers







                    Care Team Member Name    Role                Phone

 

                    Najjar, Husam      CP                  Unavailable



                                                                



Allergies

          No Known Allergies                                                    
                                   



Problems

          





             Problem Type    Condition    Code         Onset Dates    Condition Status

 

             Problem      COPD with chronic bronchitis    J44.9                     Active

 

                          Problem                   Rheumatoid arthritis involving multiple sites, unspecified rheumatoid factor

 presence           M06.9                                   Active

 

             Problem      Chronic respiratory failure with hypoxia    J96.11                    Active



                                                                                
                           



Medications

          No Known Medications                                                  
                           



Results

          No Known Results                                                      
             



Summary Purpose

          [employer] Submission

## 2019-04-23 NOTE — XMS REPORT
[facility] - Continuity of Care Document

                             Created on: 2019



SUSI GALICIA

External Reference #: [id number]

: 1945

Sex: Female



Demographics







                          Address                   [address] [place]  [address]

 

                          Home Phone                [phone]

 

                          Preferred Language        English

 

                          Marital Status            

 

                          Holiness Affiliation     Unknown

 

                          Race                      White

 

                          Ethnic Group              Non-





Author







                          Author                    [facility]

 

                          Address                   [address]

[place]  [address]



 

                          Phone                     [phone];ext=







Support







                Name            Relationship    Address         Phone

 

                    KRISTI GALICIA    Next Of Kin         [address] [place]  [address] [phone]

 

                    KRISTI GALICIA    ECON                [address] [place]  [address] [phone]







Care Team Providers







                    Care Team Member Name    Role                Phone

 

                    DAVID TINAJERO        Admphys             [phone]

 

                    DAVID TINAJERO        Attphys             [phone]







Hospital Admission Diagnosis







                    Code                Admission Diagnosis    Date

 

                    53269575            Cough                







Social History







           Element Description    Code       Description    Smoking Status Code System    Start Date    

End Date

 

           Smoking Status    065135948    Never smoker    SNOMED-CT                







Problems







           Code       Code System    Problem Name    Start Date    End Date    Status

 

                    643589406           SNOMED-CT           Acute exacerbation of chronic obstructive airways disease 

                    2019                              Active







Medications







        RxNorm    Medication    Dose    Route    Instructions    Indications    Start Date    End Date

                                        Status

 

                          5103936                   Albuterol 0.833 MG/ML / Ipratropium Bromide 0.167 MG/ML Inhalant Solution

                    3 milliliter        Inhalation          inhaled every 4 to 6 hours as needed. (as needed for 

wheezing)       wheezing                                        Active







Allergies

* No Known Allergies





Results





******** Laboratory Results ********







Order: CBC PLATELET AUTO DIFF







Specimen Source: BLOOD 







Body Site:  









          [facility]     Test      Result    Flag      Range     Unit      Date

 

                                             1Leukocytes^^corrected for nucleated erythrocytes:NCnc:Pt:Bld:Qn:Automated

 count       7.15                      4.80-10.80    10^3/ul      2019 18:14

 

           789-8      1Erythrocytes:NCnc:Pt:Bld:Qn:Automated count    4.11       L          4.20-5.40    10^6/ul

                                        2019 18:14

 

          718-7     1Hemoglobin:MCnc:Pt:Bld:Qn    12.2                12.0-14.0    gm/dl     2019 18:14

 

          4544-3    1Hematocrit:VFr:Pt:Bld:Qn:Automated count    37.5                37.0-47.0    %         2019

 18:14

 

                    787-2               1Erythrocyte mean corpuscular volume:EntVol:Pt:RBC:Qn:Automated count    91.2

                                81.0-99.0       fL              2019 18:14

 

                          785-6                     1Erythrocyte mean corpuscular hemoglobin:EntMass:Pt:RBC:Qn:Automated count

             29.7                      27.0-31.0    pg           2019 18:14

 

                          786-4                     1Erythrocyte mean corpuscular hemoglobin concentration:MCnc:Pt:RBC:Qn:Automated

 count       32.5         L            33.0-37.0    gm/dl        2019 18:14

 

                788-0           1Erythrocyte distribution width:Ratio:Pt:RBC:Qn:Automated count    14.6            H 

                    11.5-14.5           %                   2019 18:14

 

          777-3     1Platelets:NCnc:Pt:Bld:Qn:Automated count    234                 130-400    10^3/ul    2019

 18:14

 

             95495-3      1Platelet mean volume:EntVol:Pt:Bld:Qn:Automated count    10.4         A            7.4-10.4

                          fL                        2019 18:14

 

                                        Note:               'NOT MEASURED' RESULTS ARE DISPLAYED WHEN THE INSTRUMENT HAS A

SUPPRESSED OR UNREPORTABLE RESULT.  THIS WILL MOST OFTEN HAPPEN WITH THE

MPV WHEN THERE IS AN ABNORMAL PLATELET DISTRIBUTION DUE TO A CRITICAL

LOW VALUE OR PLATELET CLUMPING.  THE RDW MAY BE SUPPRESSED IF THERE ARE

MULTIPLE PEAKS PRESENT ON THE RBC HISTOGRAM.  IN THIS CASE, A MANUAL

REVIEW OF THE SLIDE WILL BE PERFORMED, AND RBC MORPHOLOGY WILL BE NOTED

ON THE REPORT. 

 

             770-8        1Neutrophils/100 leukocytes:NFr:Pt:Bld:Qn:Automated count    70.7                      42.0-75.0

                          %                         2019 18:14

 

             736-9        1Lymphocytes/100 leukocytes:NFr:Pt:Bld:Qn:Automated count    12.4         L            13.0-42.0

                          %                         2019 18:14

 

             5905-5       1Monocytes/100 leukocytes:NFr:Pt:Bld:Qn:Automated count    15.7         H            4.0-14.0

                          %                         2019 18:14

 

             713-8        1Eosinophils/100 leukocytes:NFr:Pt:Bld:Qn:Automated count    0.1          L            1.0-5.0

                          %                         2019 18:14

 

             706-2        1Basophils/100 leukocytes:NFr:Pt:Bld:Qn:Automated count    0.7                       0.0-3.0 

                          %                         2019 18:14

 

                    1IG%      0.4                 0.0-0.4    %         2019 18:14



* Performing Lab Footnotes:* [address] [place] [address] - [id number] - 
  [address] [place] [address] -  MD: DIRECTOR CHIQUIS GERARDO







________________________________________________________________________________
______













Order: CMP COMPREHENSIVE METABOLIC PANEL







Specimen Source: BLOOD 







Body Site:  









          LOINC     Test      Result    Flag      Range     Unit      Date

 

                    1Glucose    111       H             mg/dl     2019 18:14

 

                    1BUN      14                  6.0-17.0    mg/dl     2019 18:14

 

                    1Creatinine    0.9                 0.4-1.2    mg/dl     2019 18:14

 

                    1Sodium    136       L         137-145    mmol/l    2019 18:14

 

                    1Potassium    3.7                 3.5-5.0    mmol/l    2019 18:14

 

                    1Chloride    101                     mmol/l    2019 18:14

 

                    1CO2      27                  22-30     mmol/l    2019 18:14

 

                    1Calcium    8.6                 8.4-10.2    mg/dl     2019 18:14

 

                    1T Protein    7.6                 5.1-8.7    gm/dl     2019 18:14

 

                    1Albumin    3.3       L         3.5-4.6    gm/dl     2019 18:14

 

                    1A/G Ratio    0.8       L         1.1-2.2    %         2019 18:14

 

                    1AST (SGOT)    22                  11-36     U/L       2019 18:14

 

                    1ALT (SGPT)    13                  11-40     U/L       2019 18:14

 

                    1Alkaline Phos    86                      U/L       2019 18:14

 

                    1Total Bilirubin    0.2                 0.2-1.2    mg/dl     2019 18:14

 

                    1Globulin    4.3       H         2.3-3.5    gm/dl     2019 18:14

 

                    1Calcium, Corrected    9.2                 8.4-10.2    mg/dl     2019 18:14

 

                                        Note:               Various formulas exist for corrected serum calcium results, each

yielding different values.  This corrected result was based on the

formula: Corrected Calcium=SerumCalcium + [0.8 * ( 4 - SerumAlbumin)] 

 

                    1EGFR if     >60                           mL/min/1.73m^2    2019 18:14

 

                    1EGFR if Non-    >60                           mL/min/1.73m^2    2019 18:14

 

                                        Note:               Estimated Glomerular Filtration Rate (eGFR) Reference Intervals

Decision Points for 18 years and older and average body mass:

>=60                  Does not exclude kidney disease.

                                        30 - 59             Suggests moderate chronic kidney disease and

indicates the need for further investigation

including assessment of proteinuria and

cardiovascular factors.

< 30               Usually indicates a need for referral for assessment

and management of chronic kidney failure. 



* Performing Lab Footnotes:* [address] [place] [address] - [id number] - 
  [address] [place] [id number] [place] -  MD: DIRECTOR CHIQUIS GERARDO







________________________________________________________________________________
______













Order: PRO BNP B - NATRIURETIC PEPTIDE







Specimen Source: BLOOD 







Body Site:  









          LOINC     Test      Result    Flag      Range     Unit      Date

 

                    1Pro-BNP(B-Peptide)    1217      H         0-450     pg/ml     2019 18:14

 

                                        Note:               Result called to Minoo Mina RN-ER read back 



* Performing Lab Footnotes:* [address] [place] [address] - [id number] - 
  [facility] [place] [address] [place] -  MD: DIRECTOR CHIQUIS GERARDO







________________________________________________________________________________
______









Vital Signs







                    Vitals              Value               Date

 

                    Pulse Rate          99 (beats)/min      2019

 

                    Respiratory Rate    19 (breaths)/min    2019

 

                    O2% BldC Oximetry    94 %                2019

 

                    BP Systolic         161 mmHg            2019

 

                    BP Diastolic        80 mmHg             2019

 

                    Body Temperature    98.3 F              2019

 

                    Height              66 in               2019

 

                    Weight Measured     149.91 lbs          2019

 

                    BSA (Body Surface Area)    1.02308 m2          2019

 

                    BMI (Body Mass Index)    24.3 kg/m2          2019







Advance Directives





YES LIVING WILL





             Directive Type    Effective Date         Notes        Supporting Document

 

                    Name                Address             Phone

 

             No Directive Type specified    2013 21:27    Not Specified    Not Specified    Not

 Specified                None                      No







Patient does NOT have Living Will





             Directive Type    Effective Date         Notes        Supporting Document

 

                    Name                Address             Phone

 

             No Directive Type specified    08/10/2018 11:03    Not Specified    Not Specified    Not

 Specified                None                      No







Family History

* No Data Reported





Plan of Care

* No data in the system





Procedures

* No data in the system





Encounters







                Date            Code            Diagnosis       Status

 

                                (ICD10) - J441    COPD WITH ACUTE EXACERBATION    Active







Immunizations

* No data in the system





Functional Status

* No data in the system





Hospital Discharge Instructions

* No data in the system

## 2019-04-23 NOTE — XMS REPORT
[facility], formerly [facility]

                             Created on: 2019



James Brothers

External Reference #: [id number]

: 1945

Sex: Female



Demographics







                          Address                   [address] [place]  [address]

 

                          Home Phone                [phone]

 

                          Preferred Language        Unknown

 

                          Marital Status            Unknown

 

                          Jehovah's witness Affiliation     Unknown

 

                          Race                      White

 

                          Ethnic Group              UNK





Author







                          Author                    Najjar, Husam

 

                          Organization              [employer]

 

                          Address                   Unknown

 

                          Phone                     Unavailable







Care Team Providers







                    Care Team Member Name    Role                Phone

 

                    Najjar, Husam      CP                  Unavailable



                                                                



Allergies, Adverse Reactions, Alerts

          





                    Substance           Reaction            Event Type

 

                    N.K.D.A.            Info Not Available    Non Drug Allergy



                                                                                
       



Problems

          





             Problem Type    Condition    Code         Onset Dates    Condition Status

 

             Problem      COPD with chronic bronchitis    J44.9                     Active

 

                          Problem                   Rheumatoid arthritis involving multiple sites, unspecified rheumatoid factor

 presence           M06.9                                   Active

 

             Problem      Chronic respiratory failure with hypoxia    J96.11                    Active

 

             Assessment    Chronic respiratory failure with hypoxia    J96.11                    Active

 

             Assessment    Cachexia     R64                       Active

 

             Assessment    COPD with chronic bronchitis    J44.9                     Active



                                                                                
                                                         



Medications

          





        Medication    Code System    Code    Instructions    Start Date    End Date    Status    Dosage



 

        Aleve    NDC     64751790723    220 MG Orally every 12 hrs                    Active    1 tablet with

 food or milk as needed

 

        Zolpidem Tartrate    NDC     84361169667    10 MG Orally Once a day                    Active    1 tablet

 at bedtime as needed

 

        Theophylline ER    ND     14067564211    300 MG Orally every 12 hrs                    Active    0.5

 tablet

 

                Ipratropium-Albuterol    NDC             64905915922     0.5-2.5 (3) MG/3ML Inhalation every 6 hrs

                                                Active          3 ml

 

             Trelegy Ellipta    NDC          45711485663    100-62.5-25 MCG/INH Inhalation Once a day    [place]o

 29, 2019           May 29, 2019        Active              1 puff

 

          Hydrocodone-Acetaminophen    NDC       47923463543    5-325 MG Orally ONCE A DAY                        Active

                                        1 tablet as needed

 

        Amlodipine Besylate    NDC     37437095951    10 MG Orally Once a day                    Active    1 

tablet

 

        Benazepril HCl    NDC     14552778126    40 MG Orally Once a day                    Active    1 tablet



 

                ProAir RespiClick    NDC             95867871020     108 (90 Base) MCG/ACT Inhalation every 6 hrs

                                                Active          2 puffs as needed

 

        Methotrexate    NDC     38299549299    2.5 MG Orally ONCE A WEEK                    Active    8 tabs

 

        Linezolid    NDC     48100580558    600 MG Orally every 12 hrs                    Active    1 tablet

 

             Anoro Ellipta    NDC          23563344854    62.5-25 MCG/INH Inhalation Once a day    Oct 23, 

2018                2019        Active              1 puff

 

             Trelegy Ellipta    NDC          65903940420    100-62.5-25 MCG/INH Inhalation Once a day      

                                        Active              1 puff

 

        Leucovorin Calcium    NDC     38060501704    5 MG Orally ONCE A WEEK                    Active    1 tablet



 

           Levalbuterol HCl    NDC        45560616494    0.63 MG/3ML Inhalation every 8 hrs PRN                

                          Active                    3 ml

 

        Folic Acid    NDC     22370679377    1 MG Orally Once a day                    Active    1 tablet

 

          Symbicort    NDC       40285032081    160-4.5 MCG/ACT Inhalation Twice a day                        Active

                                        2 puffs



                                                                                
                                                                                
                                                                            



Results

          No Known Results                                                      
             



Summary Purpose

          [employer]ClinicalWorks Submission

## 2019-04-23 NOTE — DIAGNOSTIC IMAGING REPORT
Examination: Single AP view of the chest.



COMPARISON: None.



INDICATION: Shortness of breath

     

DISCUSSION:



Lines/tubes:  None.



Lungs:  Lungs are hyperinflated. No consolidation. Age-related interstitial

change.



Pleura:  There is no pleural effusion or pneumothorax.



Heart and mediastinum:  The heart and the mediastinum are unremarkable.



Bones and soft tissues:  No acute bony abnormalities.     



IMPRESSION:

 

1.   No acute cardiopulmonary abnormalities.



Signed by: Dr. Andrew Palisch, M.D. on 4/23/2019 4:00 PM

## 2019-04-23 NOTE — NUR
Spoke with Dr Hallman. Patient requesting medication for cough./ New order Tessalon perles 
100mg po TID PRN and Robitussin with codiene 10 cc po Q 4 hrs PRN cough.

## 2019-04-23 NOTE — XMS REPORT
[facility]

                             Created on: 10/10/2018



SUSI GALICIA

External Reference #: [id number]

: 1945

Sex: Female



Demographics







                          Address                   [address] [place]  [address]

 

                          Home Phone                [phone]

 

                          Preferred Language        Unknown

 

                          Marital Status            Unknown

 

                          Taoist Affiliation     Unknown

 

                          Race                      Other Race

 

                          Ethnic Group              UNK





Author







                          Author                    Najjar, Husam

 

                          Organization              [employer]

 

                          Address                   Unknown

 

                          Phone                     Unavailable







Care Team Providers







                    Care Team Member Name    Role                Phone

 

                    Najjar, Husam      CP                  Unavailable



                                                                



Allergies, Adverse Reactions, Alerts

          





                    Substance           Reaction            Event Type

 

                    N.K.D.A.            Info Not Available    Non Drug Allergy



                                                                                
       



Problems

          





             Problem Type    Condition    Code         Onset Dates    Condition Status

 

                          Problem                   Rheumatoid arthritis involving multiple sites, unspecified rheumatoid factor

 presence           M06.9                                   Active

 

             Problem      COPD with chronic bronchitis    J44.9                     Active

 

             Assessment    History of smoking    Z87.891                   Active

 

             Assessment    Methotrexate, long term, current use    Z79.899                   Active

 

             Assessment    COPD with chronic bronchitis    J44.9                     Active

 

             Assessment    COX (dyspnea on exertion)    R06.09                    Active



                                                                                
                                                         



Medications

          





        Medication    Code System    Code    Instructions    Start Date    End Date    Status    Dosage



 

        Amlodipine Besylate    NDC     02261236585    10 MG Orally Once a day                    Active    1 

tablet

 

        Trazodone HCl    NDC     [address]-96    50 MG Orally at HS PRN                    Active    1-2 tablet



 

           Hydrocodone-Acetaminophen    NDC        [address]-55    5-325 MG Orally ONCE A DAY                 

                          Active                    1 tablet as needed

 

        Zolpidem Tartrate    NDC     07348360178    10 MG Orally Once a day                    Active    1 tablet

 at bedtime as needed

 

                ProAir RespiClick    NDC             17744745170     108 (90 Base) MCG/ACT Inhalation every 6 hrs

                Oct 09, 2018                    Active          2 puffs as needed

 

        Methotrexate    NDC     [address]-74    2.5 MG Orally ONCE A WEEK                    Active    8 tabs



 

        Aleve    ND     11884559264    220 MG Orally every 12 hrs                    Active    1 tablet with

 food or milk as needed

 

        Folic Acid    NDC     04123392278    1 MG Orally Once a day                    Active    1 tablet

 

          Hydrochlorothiazide    NDC       [address]-28    12.5 MG Orally Once a day PRN                        Active

                                        1 capsule in the morning

 

        Leucovorin Calcium    NDC     [address]-88    5 MG Orally ONCE A WEEK                    Active    1

 tablet

 

          Symbicort    NDC       12276315850    160-4.5 MCG/ACT Inhalation Twice a day                        Active

                                        2 puffs

 

        Etodolac    NDC     95396457383    400 MG Orally Twice a day                    Active    1 tablet with

 food

 

        Benazepril HCl    NDC     65679257494    40 MG Orally Once a day                    Active    1 tablet



 

             Levalbuterol HCl    NDC          [address]-24    0.63 MG/3ML Inhalation every 8 hrs PRN      

                                        Active              3 ml



                                                                                
                                                                                
                                              



Results

          No Known Results                                                      
             



Summary Purpose

          eClinicalWorks Submission

## 2019-04-24 VITALS — DIASTOLIC BLOOD PRESSURE: 73 MMHG | SYSTOLIC BLOOD PRESSURE: 133 MMHG

## 2019-04-24 VITALS — DIASTOLIC BLOOD PRESSURE: 79 MMHG | SYSTOLIC BLOOD PRESSURE: 164 MMHG

## 2019-04-24 VITALS — SYSTOLIC BLOOD PRESSURE: 133 MMHG | DIASTOLIC BLOOD PRESSURE: 73 MMHG

## 2019-04-24 VITALS — DIASTOLIC BLOOD PRESSURE: 75 MMHG | SYSTOLIC BLOOD PRESSURE: 119 MMHG

## 2019-04-24 VITALS — SYSTOLIC BLOOD PRESSURE: 123 MMHG | DIASTOLIC BLOOD PRESSURE: 60 MMHG

## 2019-04-24 VITALS — SYSTOLIC BLOOD PRESSURE: 156 MMHG | DIASTOLIC BLOOD PRESSURE: 64 MMHG

## 2019-04-24 VITALS — SYSTOLIC BLOOD PRESSURE: 133 MMHG | DIASTOLIC BLOOD PRESSURE: 69 MMHG

## 2019-04-24 VITALS — DIASTOLIC BLOOD PRESSURE: 51 MMHG | SYSTOLIC BLOOD PRESSURE: 131 MMHG

## 2019-04-24 LAB
ANION GAP SERPL CALC-SCNC: 13.7 MMOL/L (ref 8–16)
ANISOCYTOSIS BLD QL SMEAR: SLIGHT
BASOPHILS # BLD AUTO: 0 10*3/UL (ref 0–0.1)
BASOPHILS NFR BLD AUTO: 0.2 % (ref 0–1)
BUN SERPL-MCNC: 13 MG/DL (ref 7–26)
BUN/CREAT SERPL: 17 (ref 6–25)
CALCIUM SERPL-MCNC: 9.1 MG/DL (ref 8.4–10.2)
CHLORIDE SERPL-SCNC: 106 MMOL/L (ref 98–107)
CK MB SERPL-MCNC: 3.2 NG/ML (ref 0–5)
CK MB SERPL-MCNC: 3.6 NG/ML (ref 0–5)
CK MB SERPL-MCNC: 5.2 NG/ML (ref 0–5)
CK SERPL-CCNC: 43 IU/L (ref 29–168)
CK SERPL-CCNC: 45 IU/L (ref 29–168)
CK SERPL-CCNC: 66 IU/L (ref 29–168)
CO2 SERPL-SCNC: 23 MMOL/L (ref 22–29)
DEPRECATED NEUTROPHILS # BLD AUTO: 16.5 10*3/UL (ref 2.1–6.9)
EGFRCR SERPLBLD CKD-EPI 2021: > 60 ML/MIN (ref 60–?)
EOSINOPHIL # BLD AUTO: 0.1 10*3/UL (ref 0–0.4)
EOSINOPHIL NFR BLD AUTO: 0.6 % (ref 0–6)
ERYTHROCYTE [DISTWIDTH] IN CORD BLOOD: 13.7 % (ref 11.7–14.4)
GLUCOSE SERPLBLD-MCNC: 169 MG/DL (ref 74–118)
HCT VFR BLD AUTO: 33.6 % (ref 34.2–44.1)
HGB BLD-MCNC: 10.7 G/DL (ref 12–16)
HYPOCHROMIA BLD QL SMEAR: (no result)
LYMPHOCYTES # BLD: 1.2 10*3/UL (ref 1–3.2)
LYMPHOCYTES NFR BLD AUTO: 6.7 % (ref 18–39.1)
LYMPHOCYTES NFR BLD MANUAL: 5 % (ref 19–48)
MCH RBC QN AUTO: 27.3 PG (ref 28–32)
MCHC RBC AUTO-ENTMCNC: 31.8 G/DL (ref 31–35)
MCV RBC AUTO: 85.7 FL (ref 81–99)
MONOCYTES # BLD AUTO: 0.3 10*3/UL (ref 0.2–0.8)
MONOCYTES NFR BLD AUTO: 1.6 % (ref 4.4–11.3)
MONOCYTES NFR BLD MANUAL: 1 % (ref 3.4–9)
NEUTS SEG NFR BLD AUTO: 89.9 % (ref 38.7–80)
NEUTS SEG NFR BLD MANUAL: 93 % (ref 40–74)
PLAT MORPH BLD: NORMAL
PLATELET # BLD AUTO: 364 X10E3/UL (ref 140–360)
PLATELET # BLD EST: ADEQUATE 10*3/UL
POTASSIUM SERPL-SCNC: 3.7 MMOL/L (ref 3.5–5.1)
RBC # BLD AUTO: 3.92 X10E6/UL (ref 3.6–5.1)
RBC MORPH BLD: NORMAL
SODIUM SERPL-SCNC: 139 MMOL/L (ref 136–145)

## 2019-04-24 RX ADMIN — GUAIFENESIN AND CODEINE PHOSPHATE PRN ML: 100; 10 SOLUTION ORAL at 00:31

## 2019-04-24 RX ADMIN — BENAZEPRIL HYDROCHLORIDE SCH MG: 10 TABLET, COATED ORAL at 10:00

## 2019-04-24 RX ADMIN — TRAZODONE HYDROCHLORIDE SCH MG: 50 TABLET ORAL at 21:46

## 2019-04-24 RX ADMIN — ENOXAPARIN SODIUM SCH MG: 40 INJECTION SUBCUTANEOUS at 17:00

## 2019-04-24 RX ADMIN — METHYLPREDNISOLONE SODIUM SUCCINATE SCH MG: 125 INJECTION, POWDER, FOR SOLUTION INTRAMUSCULAR; INTRAVENOUS at 21:46

## 2019-04-24 RX ADMIN — METHYLPREDNISOLONE SODIUM SUCCINATE SCH MG: 125 INJECTION, POWDER, FOR SOLUTION INTRAMUSCULAR; INTRAVENOUS at 06:17

## 2019-04-24 RX ADMIN — LEVALBUTEROL HYDROCHLORIDE SCH MG: 1.25 SOLUTION RESPIRATORY (INHALATION) at 04:00

## 2019-04-24 RX ADMIN — CEFTRIAXONE SCH MLS/HR: 100 INJECTION, POWDER, FOR SOLUTION INTRAVENOUS at 06:17

## 2019-04-24 RX ADMIN — LEVALBUTEROL HYDROCHLORIDE SCH MG: 1.25 SOLUTION RESPIRATORY (INHALATION) at 11:25

## 2019-04-24 RX ADMIN — AMLODIPINE BESYLATE SCH MG: 10 TABLET ORAL at 09:00

## 2019-04-24 RX ADMIN — Medication SCH MG: at 09:00

## 2019-04-24 RX ADMIN — CEFTRIAXONE SCH MLS/HR: 100 INJECTION, POWDER, FOR SOLUTION INTRAVENOUS at 18:27

## 2019-04-24 RX ADMIN — LEVALBUTEROL HYDROCHLORIDE SCH MG: 1.25 SOLUTION RESPIRATORY (INHALATION) at 23:24

## 2019-04-24 RX ADMIN — AZITHROMYCIN SCH MLS/HR: 100 INJECTION, POWDER, LYOPHILIZED, FOR SOLUTION INTRAVENOUS at 09:00

## 2019-04-24 RX ADMIN — LEVALBUTEROL HYDROCHLORIDE SCH MG: 1.25 SOLUTION RESPIRATORY (INHALATION) at 15:05

## 2019-04-24 RX ADMIN — LEVALBUTEROL HYDROCHLORIDE SCH MG: 1.25 SOLUTION RESPIRATORY (INHALATION) at 07:00

## 2019-04-24 RX ADMIN — GUAIFENESIN AND CODEINE PHOSPHATE PRN ML: 100; 10 SOLUTION ORAL at 04:30

## 2019-04-24 RX ADMIN — METHYLPREDNISOLONE SODIUM SUCCINATE SCH MG: 125 INJECTION, POWDER, FOR SOLUTION INTRAMUSCULAR; INTRAVENOUS at 14:00

## 2019-04-24 RX ADMIN — LEVALBUTEROL HYDROCHLORIDE SCH MG: 1.25 SOLUTION RESPIRATORY (INHALATION) at 19:43

## 2019-04-24 NOTE — HISTORY AND PHYSICAL
CHIEF COMPLAINT:  Cough, congestion, shortness of breath.

 

HISTORY OF PRESENT ILLNESS:  This is a 74-year-old  female, who lives in a

[place], [place], who was recently admitted in the hospital with similar findings of

shortness of breath three weeks ago, was discharged, now presents to the ED with

complaints of cough, congestion, shortness of breath for the last several days.  While

here, the patient was found to have influenza B positive.  Does endorse smoking for more

than 60+ years.  She reports she is currently not smoking at this time.  She is on home

O2 as well.  Denies any chest pain, palpitation, nausea, vomiting, or any other

complaints.  The patient was seen and evaluated at bedside in the [facility].  She is

currently very stable with no other issues at this time. 

 

REVIEW OF SYSTEMS:

Pertinent positives:  Cough, congestion, and shortness of breath. 

 

Pertinent negatives:  Denies any chest pain, palpitation, nausea, vomiting, diarrhea,

dysuria, hematuria, frequency, urgency, lightheadedness, dizziness, abdominal pain,

headaches, or fever. 

 

The rest of 14-point review of systems have been reviewed with the patient and are

negative. 

 

ALLERGIES:  NO KNOWN DRUG ALLERGIES.

 

HOME MEDICATIONS:  Amlodipine 10 mg daily, folic acid 1 mg daily.  Takes neb treatments,

DuoNebs at home.  Methotrexate 2.5 mg eight tablets every 7 days on Wednesday.

Prednisone 10 mg daily, trazodone 50 mg at bedtime for sleep, benazepril 40 mg daily,

and __________ insulin at home. 

 

PAST MEDICAL HISTORY:  She has rheumatoid arthritis, hypertension, type 2 diabetes, COPD.

 

PAST SURGICAL HISTORY:  Reports none.

 

FAMILY HISTORY:  Hypertension and diabetes.

 

SOCIAL HISTORY:  No drugs.  No alcohol.  Former smoker 60+ years, she quit four years

ago. 

 

PHYSICAL EXAMINATION:

VITAL SIGNS:  Temperature is 98.2, pulse 78, respiratory rate is 18, blood pressure

123/60, pulse ox is 94%, she is on 2 L nasal cannula. 

GENERAL:  Not in acute distress.  Alert and oriented x3.  Cooperative on examination. 

HEENT:  Head is normocephalic and atraumatic.  Eyes; pupils are equal, round, and

reactive to light bilaterally.  Extraocular movements are intact bilaterally.  Throat,

no evidence of erythema or exudates in the posterior pharynx.  Has poor dentition. 

NECK:  Supple.  Good range of motion. 

PULMONARY:  Has expiratory wheezing appreciated with decreased inspiratory effort.  No

crackles appreciated.  No rhonchi. 

CARDIOVASCULAR:  Positive S1, S2.  No murmurs, rubs, or gallops appreciated. 

ABDOMEN:  Soft, nondistended, and nontender to palpation.  Bowel sounds present. 

MUSCULOSKELETAL:  Strength is 5/5 throughout.  No evidence of any muscle deficits on

examination.  No weakness appreciated. 

NEUROLOGICAL:  Cranial nerves 2 through 12 grossly intact.  No evidence of any

neurological deficits on exam. 

SKIN:  Intact.  Warm to touch.  Good cap refill. 

PSYCHIATRIC:  Normal affect and mood. 

EXTREMITIES:  No edema.  Good range of motion throughout.

LABORATORY DATA:  Lab findings show white count on admission was 25, now 18.3;

hemoglobin 10.7; hematocrit 33.6; platelets of 364.  Coagulation; PT 13, INR 0.98.

Chemistry; sodium 139, potassium 3.7, chloride 103, bicarb 23, anion gap of 13, BUN is

13, creatinine is 0.78, glucose is 169.  Lactic acid is 15, normal at this hospital.

Calcium 9.1.  Troponins were negative.  .  Albumin was 3.  LFTs were normal.

Magnesium is 1.8.  Serology, group B positive. 

 

MICROBIOLOGY:  Blood cultures are pending.

 

IMAGING STUDIES:  Chest x-ray was negative.  CT chest shows no pulmonary embolism.

Pulmonary edema.  __________ in the right lower lung may be related to infectious or

aspiration. 

 

IMPRESSION:  

1. Acute exacerbation of chronic obstructive pulmonary disease.

2. Influenza B positive.

3. Hypertension.

4. Type 2 diabetes.

5. Rheumatoid arthritis.

 

PLAN:   At this time, the patient will be on steroids, neb treatments, antibiotics.

Blood cultures are pending.  In relation to her flu, she is currently on Tamiflu as well

for additional five more days.  Resume same antihypertensive medications, p.r.n.

hydralazine for elevated blood pressure.  Insulin sliding scale, Accu-Cheks, A1c.

Resume her rheumatoid arthritis medications.  Put on 

Lovenox for DVT prophylaxis.  The patient is already on home O2 around 2 L already.  Get

a.m. labs. 

 

 

 

 

______________________________

MD FRANDY Angulo/MODL

D:  04/24/2019 09:05:11

T:  04/24/2019 12:03:26

Job #:  [id number]

## 2019-04-25 VITALS — DIASTOLIC BLOOD PRESSURE: 59 MMHG | SYSTOLIC BLOOD PRESSURE: 137 MMHG

## 2019-04-25 VITALS — SYSTOLIC BLOOD PRESSURE: 142 MMHG | DIASTOLIC BLOOD PRESSURE: 69 MMHG

## 2019-04-25 VITALS — DIASTOLIC BLOOD PRESSURE: 81 MMHG | SYSTOLIC BLOOD PRESSURE: 141 MMHG

## 2019-04-25 VITALS — SYSTOLIC BLOOD PRESSURE: 141 MMHG | DIASTOLIC BLOOD PRESSURE: 81 MMHG

## 2019-04-25 VITALS — SYSTOLIC BLOOD PRESSURE: 135 MMHG | DIASTOLIC BLOOD PRESSURE: 65 MMHG

## 2019-04-25 VITALS — DIASTOLIC BLOOD PRESSURE: 55 MMHG | SYSTOLIC BLOOD PRESSURE: 117 MMHG

## 2019-04-25 LAB
ANION GAP SERPL CALC-SCNC: 12.1 MMOL/L (ref 8–16)
BASOPHILS # BLD AUTO: 0.1 10*3/UL (ref 0–0.1)
BASOPHILS NFR BLD AUTO: 0.2 % (ref 0–1)
BUN SERPL-MCNC: 16 MG/DL (ref 7–26)
BUN/CREAT SERPL: 21 (ref 6–25)
CALCIUM SERPL-MCNC: 9 MG/DL (ref 8.4–10.2)
CHLORIDE SERPL-SCNC: 106 MMOL/L (ref 98–107)
CO2 SERPL-SCNC: 25 MMOL/L (ref 22–29)
DEPRECATED NEUTROPHILS # BLD AUTO: 21.6 10*3/UL (ref 2.1–6.9)
EGFRCR SERPLBLD CKD-EPI 2021: > 60 ML/MIN (ref 60–?)
EOSINOPHIL # BLD AUTO: 0.1 10*3/UL (ref 0–0.4)
EOSINOPHIL NFR BLD AUTO: 0.2 % (ref 0–6)
ERYTHROCYTE [DISTWIDTH] IN CORD BLOOD: 13.6 % (ref 11.7–14.4)
GLUCOSE SERPLBLD-MCNC: 154 MG/DL (ref 74–118)
HCT VFR BLD AUTO: 32.5 % (ref 34.2–44.1)
HGB BLD-MCNC: 10.1 G/DL (ref 12–16)
HYPOCHROMIA BLD QL SMEAR: SLIGHT
LYMPHOCYTES # BLD: 1.7 10*3/UL (ref 1–3.2)
LYMPHOCYTES NFR BLD AUTO: 6.9 % (ref 18–39.1)
LYMPHOCYTES NFR BLD MANUAL: 1 % (ref 19–48)
MCH RBC QN AUTO: 27.2 PG (ref 28–32)
MCHC RBC AUTO-ENTMCNC: 31.1 G/DL (ref 31–35)
MCV RBC AUTO: 87.6 FL (ref 81–99)
MONOCYTES # BLD AUTO: 0.8 10*3/UL (ref 0.2–0.8)
MONOCYTES NFR BLD AUTO: 3.1 % (ref 4.4–11.3)
MONOCYTES NFR BLD MANUAL: 2 % (ref 3.4–9)
NEUTS SEG NFR BLD AUTO: 88.3 % (ref 38.7–80)
NEUTS SEG NFR BLD MANUAL: 95 % (ref 40–74)
PLAT MORPH BLD: NORMAL
PLATELET # BLD AUTO: 335 X10E3/UL (ref 140–360)
PLATELET # BLD EST: ADEQUATE 10*3/UL
POTASSIUM SERPL-SCNC: 4.1 MMOL/L (ref 3.5–5.1)
RBC # BLD AUTO: 3.71 X10E6/UL (ref 3.6–5.1)
RBC MORPH BLD: NORMAL
SODIUM SERPL-SCNC: 139 MMOL/L (ref 136–145)

## 2019-04-25 RX ADMIN — AZITHROMYCIN SCH MLS/HR: 100 INJECTION, POWDER, LYOPHILIZED, FOR SOLUTION INTRAVENOUS at 12:20

## 2019-04-25 RX ADMIN — CEFTRIAXONE SCH MLS/HR: 100 INJECTION, POWDER, FOR SOLUTION INTRAVENOUS at 06:23

## 2019-04-25 RX ADMIN — LEVALBUTEROL HYDROCHLORIDE SCH MG: 1.25 SOLUTION RESPIRATORY (INHALATION) at 15:00

## 2019-04-25 RX ADMIN — LEVALBUTEROL HYDROCHLORIDE SCH MG: 1.25 SOLUTION RESPIRATORY (INHALATION) at 07:00

## 2019-04-25 RX ADMIN — VANCOMYCIN HYDROCHLORIDE SCH MLS/HR: 1 INJECTION, SOLUTION INTRAVENOUS at 15:05

## 2019-04-25 RX ADMIN — CEFEPIME HYDROCHLORIDE SCH MLS/HR: 1 INJECTION, POWDER, FOR SOLUTION INTRAMUSCULAR; INTRAVENOUS at 12:20

## 2019-04-25 RX ADMIN — LEVALBUTEROL HYDROCHLORIDE SCH MG: 1.25 SOLUTION RESPIRATORY (INHALATION) at 03:15

## 2019-04-25 RX ADMIN — TRAZODONE HYDROCHLORIDE SCH MG: 50 TABLET ORAL at 21:15

## 2019-04-25 RX ADMIN — ENOXAPARIN SODIUM SCH MG: 40 INJECTION SUBCUTANEOUS at 16:38

## 2019-04-25 RX ADMIN — METHYLPREDNISOLONE SODIUM SUCCINATE SCH MG: 125 INJECTION, POWDER, FOR SOLUTION INTRAMUSCULAR; INTRAVENOUS at 15:05

## 2019-04-25 RX ADMIN — BENAZEPRIL HYDROCHLORIDE SCH MG: 10 TABLET, COATED ORAL at 08:55

## 2019-04-25 RX ADMIN — CEFEPIME HYDROCHLORIDE SCH MLS/HR: 1 INJECTION, POWDER, FOR SOLUTION INTRAMUSCULAR; INTRAVENOUS at 23:56

## 2019-04-25 RX ADMIN — LEVALBUTEROL HYDROCHLORIDE SCH MG: 1.25 SOLUTION RESPIRATORY (INHALATION) at 11:25

## 2019-04-25 RX ADMIN — METHYLPREDNISOLONE SODIUM SUCCINATE SCH MG: 125 INJECTION, POWDER, FOR SOLUTION INTRAMUSCULAR; INTRAVENOUS at 21:15

## 2019-04-25 RX ADMIN — LEVALBUTEROL HYDROCHLORIDE SCH MG: 1.25 SOLUTION RESPIRATORY (INHALATION) at 19:52

## 2019-04-25 RX ADMIN — Medication SCH MG: at 07:21

## 2019-04-25 RX ADMIN — LEVALBUTEROL HYDROCHLORIDE SCH MG: 1.25 SOLUTION RESPIRATORY (INHALATION) at 23:02

## 2019-04-25 RX ADMIN — AMLODIPINE BESYLATE SCH MG: 10 TABLET ORAL at 08:55

## 2019-04-25 RX ADMIN — METHYLPREDNISOLONE SODIUM SUCCINATE SCH MG: 125 INJECTION, POWDER, FOR SOLUTION INTRAMUSCULAR; INTRAVENOUS at 06:22

## 2019-04-25 NOTE — NUR
Report received from AM DESEAN Jung. Patient received eating food sitting on edge of the bed. 
Denied pain and no SOB. Respiration even and unlabored,continued on 2liters oxygen nasal 
canula. Patient's daughter in the room. Patient instructed to call for help as needed, 
verbalized understand. Bed in lower position,locked. Call bell within reach.

## 2019-04-25 NOTE — HISTORY AND PHYSICAL
REASON FOR CONSULTATION:  

1. Leukocytosis.

2. Pneumonia, recommendation antibiotic.

 

HISTORY OF PRESENT ILLNESS:  Ms. Zev Ramirez is a 74-year-old white female, who

lives in [place], [place].  She recently was admitted a month ago at [facility].  She was there for eight days.  She apparently had pneumonia.  The patient who

has been getting progressively worse and apparently she was diagnosed with pneumonia and

she was discharged home after eight days with some oral antibiotic and some oxygen.  The

patient also has history of severe rheumatoid arthritis, and she comes to [facility] today

for steroid injection.  She is also on methotrexate.  The patient came here to [facility]

to get her steroid injection.  She went home, but she felt really bad, short of breath,

cough, so she came back to the emergency room in this facility, where she was admitted.

She was found to have influenza B.  She started antibiotic, but it is also noted that

she has high white count, so I am asked to see her.  The patient is quite short of

breath at the present time, but she is telling me that her shortness of breath has been

getting progressively worse over the last few months.  She is having some cough and

rhonchi, but the cough is weak, except some sputum.  She said it is whitish. 

 

PAST MEDICAL HISTORY:  COPD, congestive heart failure, rheumatoid arthritis,

hypertension, and diabetes mellitus type 2. 

 

PAST SURGICAL HISTORY:  She denies.

 

ALLERGIES:  NKA.

 

SOCIAL HISTORY:  She smoked 60+ a year, but she quit four years ago.

 

FAMILY HISTORY:  Hypertension.

 

REVIEW OF SYSTEMS:

Shortness of breath, cough, feeling bad, congestion, arthritic pain.  Other than that,

the remaining review of the system is unremarkable. 

HEENT:  There is no visual change or hearing changes. 

GASTROINTESTINAL:  There is no nausea.  No vomiting.  No diarrhea. 

CARDIAC:  There is no chest pain or arrhythmia at present time. 

GENITOURINARY:  There is no urgency or frequency. 

SKIN:  There is no other rash.

 

MEDICATION LIST:  She is currently on:

1. Cefepime.

2. Azithromycin.

3. Xopenex.

4. Lotensin.

5. Norvasc.

6. Tamiflu.

7. Folic acid.

8. Lovenox.

9. Methylprednisolone. 

Her medication list reviewed as mentioned above.

 

PHYSICAL EXAMINATION:

GENERAL:  She is alert, does not seem to be in acute distress.  The patient does look

short of breath. 

VITAL SIGNS:  Stable, currently afebrile. 

HEENT:  Normocephalic.  Not icteric. 

NECK:  Supple. 

CHEST:  Few rhonchi bilateral. 

HEART:  S1 and S2.  No S3, S4, or murmur. 

ABDOMEN:  Soft.  Bowel sounds present.  No tenderness. 

EXTREMITIES:  No edema.

LABORATORY DATA:  Her laboratory data reviewed.  White count is 25.36 on admission, came

down to __________, and now up to 24.5; hemoglobin of 10; hematocrit 32; and platelet of

335.  Her sodium 139, potassium 4.1, creatinine 0.77, and glucose 154.  Her blood

cultures are negative.  Her influenza B was positive. 

 

IMAGING DATA:  CAT scan of the chest reviewed no pulmonary embolism, but there is

pulmonary edema.  There are scattered nodules in the right lower lung. 

 

IMPRESSION:  

1. Leukocytosis.  I think is due to steroid that she has been getting.  She just

received another dose of steroid before she came here, now she is currently on steroid. 

2. Pneumonia with influenza.  The patient is on Tamiflu plan of seven days, concerned

about superimposed infection.  She is currently on cefepime and azithromycin.  We will

add vancomycin. 

3. Rheumatoid arthritis.

4. Severe chronic obstructive pulmonary disease.

5. Debility.

6. We will follow with you.  Discussed with the patient.  Discussed with Internal

Medicine. 

Time spent 60 minutes.

 

 

 

 

______________________________

MD ALVIN Newman/MEL

D:  04/25/2019 14:07:52

T:  04/25/2019 21:18:52

Job #:  [id number]

## 2019-04-25 NOTE — NUR
Received patient from day nurse, patient stable, patient received all due medications, 
patient iv access was infiltrated on assessment and new iv was placed in left ac 20g.

## 2019-04-25 NOTE — NUR
patient very particular about med times. will let her adjust home meds to her desire for a 
more pleasant stay.

## 2019-04-25 NOTE — PROGRESS NOTE
DATE:  04/25/2019

 

Medicine Progress Note 

 

SUBJECTIVE:  The patient is stating now feeling well today.  She still reports having

some shortness of breath on examination.  We will go ahead and get Pulmonary

consultation as well as the patient is on methotrexate and could be having some

interstitial lung issues from methotrexate.  She has been afebrile.  Vital signs stable.

 She reports eating, but not much.  The patient is on home O2 as well, recently started. 

 

OBJECTIVE:  VITAL SIGNS:  Temperature 97.9, pulse 85, respiratory rate is 22, blood

pressure 141/81, pulse ox 100%.  She is on 2 L nasal cannula. 

GENERAL:  Not in acute distress.  Alert and oriented x3.  Cooperative on examination. 

HEENT:  Head is normocephalic and atraumatic.  Eyes; pupils are equal, round, and

reactive to light bilaterally.  Extraocular movements are intact bilaterally.  Throat,

no evidence of any erythema or exudates in the posterior pharynx.  Has poor dentition. 

NECK:  Supple.  Good range of motion. 

PULMONARY:  Clear to auscultation bilaterally.  No rales, no rhonchi.  The patient has

some expiratory wheezing appreciated on examination, has mild fine crackles. 

CARDIOVASCULAR:  Positive S1, S2.  No murmurs, rubs, or gallops appreciated. 

GI:  Abdomen is soft, nondistended, and nontender to palpation.  Bowel sounds present. 

MUSCULOSKELETAL:  Strength is 5/5 throughout.  No evidence of any muscle deficits on

examination.  No weakness appreciated. 

NEUROLOGICAL:  Cranial nerves 2 through 12 grossly intact.  No evidence of any

neurological deficits on exam. 

SKIN:  Intact.  Warm to touch.  Good cap refill. 

PSYCHIATRIC:  Normal affect and mood. 

EXTREMITIES:  No edema.  Good range of motion throughout.

 

LABORATORY FINDINGS:  Show white count now is 24, hemoglobin 10, hematocrit is 32, and

platelets of 335.  Coagulation, none.  Chemistry; sodium 139, potassium 4.1, chloride

103, bicarb 25, anion gap of 12, BUN 16, creatinine is 0.77, glucose is 154, calcium is

9.  Flu B positive. 

 

MICROBIOLOGY:  Blood cultures, no growth.

 

IMAGING STUDIES:  None.

 

IMPRESSION:  

1. Acute exacerbation of chronic obstructive pulmonary disease.

2. Influenza B positive.

3. Hypertension.

4. Type 2 diabetes.

5. Rheumatoid arthritis concerning for possible interstitial lung disease from

methotrexate. 

 

PLAN:  At this time, continue with steroids, neb treatments, antibiotics.  Blood

cultures were negative.  She will continue with Tamiflu for underlying flu.  We will

consult Pulmonary to help 

and assist in this particular case.  The patient is on home O2, 2 L already.  She is on

Lovenox for DVT prophylaxis.  Encourage PT, OT.  Insulin sliding scale for diabetes.

Get a.m. labs. 

 

 

 

 

______________________________

MD FRANDY Angulo/MEL

D:  04/25/2019 10:08:55

T:  04/25/2019 12:18:58

Job #:  [id number]

## 2019-04-26 VITALS — DIASTOLIC BLOOD PRESSURE: 61 MMHG | SYSTOLIC BLOOD PRESSURE: 135 MMHG

## 2019-04-26 VITALS — SYSTOLIC BLOOD PRESSURE: 154 MMHG | DIASTOLIC BLOOD PRESSURE: 76 MMHG

## 2019-04-26 VITALS — DIASTOLIC BLOOD PRESSURE: 69 MMHG | SYSTOLIC BLOOD PRESSURE: 137 MMHG

## 2019-04-26 VITALS — SYSTOLIC BLOOD PRESSURE: 169 MMHG | DIASTOLIC BLOOD PRESSURE: 78 MMHG

## 2019-04-26 VITALS — SYSTOLIC BLOOD PRESSURE: 112 MMHG | DIASTOLIC BLOOD PRESSURE: 78 MMHG

## 2019-04-26 VITALS — DIASTOLIC BLOOD PRESSURE: 68 MMHG | SYSTOLIC BLOOD PRESSURE: 134 MMHG

## 2019-04-26 VITALS — SYSTOLIC BLOOD PRESSURE: 121 MMHG | DIASTOLIC BLOOD PRESSURE: 56 MMHG

## 2019-04-26 LAB
ALBUMIN SERPL-MCNC: 2.5 G/DL (ref 3.5–5)
ALBUMIN/GLOB SERPL: 0.7 {RATIO} (ref 0.8–2)
ALP SERPL-CCNC: 79 IU/L (ref 40–150)
ALT SERPL-CCNC: 18 IU/L (ref 0–55)
ANION GAP SERPL CALC-SCNC: 12.6 MMOL/L (ref 8–16)
BASOPHILS # BLD AUTO: 0 10*3/UL (ref 0–0.1)
BASOPHILS NFR BLD AUTO: 0.2 % (ref 0–1)
BUN SERPL-MCNC: 24 MG/DL (ref 7–26)
BUN/CREAT SERPL: 30 (ref 6–25)
CALCIUM SERPL-MCNC: 9 MG/DL (ref 8.4–10.2)
CHLORIDE SERPL-SCNC: 107 MMOL/L (ref 98–107)
CO2 SERPL-SCNC: 26 MMOL/L (ref 22–29)
DEPRECATED NEUTROPHILS # BLD AUTO: 17.4 10*3/UL (ref 2.1–6.9)
EGFRCR SERPLBLD CKD-EPI 2021: > 60 ML/MIN (ref 60–?)
EOSINOPHIL # BLD AUTO: 0 10*3/UL (ref 0–0.4)
EOSINOPHIL NFR BLD AUTO: 0 % (ref 0–6)
ERYTHROCYTE [DISTWIDTH] IN CORD BLOOD: 13.9 % (ref 11.7–14.4)
GLOBULIN PLAS-MCNC: 3.7 G/DL (ref 2.3–3.5)
GLUCOSE SERPLBLD-MCNC: 139 MG/DL (ref 74–118)
HCT VFR BLD AUTO: 34.1 % (ref 34.2–44.1)
HGB BLD-MCNC: 10.7 G/DL (ref 12–16)
LYMPHOCYTES # BLD: 1 10*3/UL (ref 1–3.2)
LYMPHOCYTES NFR BLD AUTO: 5 % (ref 18–39.1)
MAGNESIUM SERPL-MCNC: 2.2 MG/DL (ref 1.3–2.1)
MCH RBC QN AUTO: 27.3 PG (ref 28–32)
MCHC RBC AUTO-ENTMCNC: 31.4 G/DL (ref 31–35)
MCV RBC AUTO: 87 FL (ref 81–99)
MONOCYTES # BLD AUTO: 0.5 10*3/UL (ref 0.2–0.8)
MONOCYTES NFR BLD AUTO: 2.7 % (ref 4.4–11.3)
NEUTS SEG NFR BLD AUTO: 91.1 % (ref 38.7–80)
PLATELET # BLD AUTO: 332 X10E3/UL (ref 140–360)
POTASSIUM SERPL-SCNC: 4.6 MMOL/L (ref 3.5–5.1)
RBC # BLD AUTO: 3.92 X10E6/UL (ref 3.6–5.1)
SODIUM SERPL-SCNC: 141 MMOL/L (ref 136–145)

## 2019-04-26 RX ADMIN — Medication SCH MG: at 09:43

## 2019-04-26 RX ADMIN — BENAZEPRIL HYDROCHLORIDE SCH MG: 10 TABLET, COATED ORAL at 09:44

## 2019-04-26 RX ADMIN — AMLODIPINE BESYLATE SCH MG: 10 TABLET ORAL at 09:43

## 2019-04-26 RX ADMIN — LEVALBUTEROL HYDROCHLORIDE SCH MG: 1.25 SOLUTION RESPIRATORY (INHALATION) at 19:40

## 2019-04-26 RX ADMIN — VANCOMYCIN HYDROCHLORIDE SCH MLS/HR: 1 INJECTION, SOLUTION INTRAVENOUS at 04:21

## 2019-04-26 RX ADMIN — TRAZODONE HYDROCHLORIDE SCH MG: 50 TABLET ORAL at 21:00

## 2019-04-26 RX ADMIN — CEFEPIME HYDROCHLORIDE SCH MLS/HR: 1 INJECTION, POWDER, FOR SOLUTION INTRAMUSCULAR; INTRAVENOUS at 23:49

## 2019-04-26 RX ADMIN — ENOXAPARIN SODIUM SCH MG: 40 INJECTION SUBCUTANEOUS at 17:00

## 2019-04-26 RX ADMIN — LEVALBUTEROL HYDROCHLORIDE SCH MG: 1.25 SOLUTION RESPIRATORY (INHALATION) at 07:00

## 2019-04-26 RX ADMIN — AZITHROMYCIN SCH MLS/HR: 100 INJECTION, POWDER, LYOPHILIZED, FOR SOLUTION INTRAVENOUS at 09:46

## 2019-04-26 RX ADMIN — LEVALBUTEROL HYDROCHLORIDE SCH MG: 1.25 SOLUTION RESPIRATORY (INHALATION) at 14:30

## 2019-04-26 RX ADMIN — GUAIFENESIN AND CODEINE PHOSPHATE PRN ML: 100; 10 SOLUTION ORAL at 22:43

## 2019-04-26 RX ADMIN — METHYLPREDNISOLONE SODIUM SUCCINATE SCH MG: 125 INJECTION, POWDER, FOR SOLUTION INTRAMUSCULAR; INTRAVENOUS at 21:55

## 2019-04-26 RX ADMIN — LEVALBUTEROL HYDROCHLORIDE SCH MG: 1.25 SOLUTION RESPIRATORY (INHALATION) at 11:00

## 2019-04-26 RX ADMIN — ENOXAPARIN SODIUM SCH MG: 40 INJECTION SUBCUTANEOUS at 19:25

## 2019-04-26 RX ADMIN — METHYLPREDNISOLONE SODIUM SUCCINATE SCH MG: 125 INJECTION, POWDER, FOR SOLUTION INTRAMUSCULAR; INTRAVENOUS at 13:27

## 2019-04-26 RX ADMIN — LEVALBUTEROL HYDROCHLORIDE SCH MG: 1.25 SOLUTION RESPIRATORY (INHALATION) at 03:25

## 2019-04-26 RX ADMIN — CEFEPIME HYDROCHLORIDE SCH MLS/HR: 1 INJECTION, POWDER, FOR SOLUTION INTRAMUSCULAR; INTRAVENOUS at 13:27

## 2019-04-26 RX ADMIN — METHYLPREDNISOLONE SODIUM SUCCINATE SCH MG: 125 INJECTION, POWDER, FOR SOLUTION INTRAMUSCULAR; INTRAVENOUS at 06:32

## 2019-04-26 RX ADMIN — VANCOMYCIN HYDROCHLORIDE SCH MLS/HR: 1 INJECTION, SOLUTION INTRAVENOUS at 19:25

## 2019-04-26 RX ADMIN — LEVALBUTEROL HYDROCHLORIDE SCH MG: 1.25 SOLUTION RESPIRATORY (INHALATION) at 23:48

## 2019-04-26 NOTE — PROGRESS NOTE
DATE:  04/26/2019

 

Medicine Progress Note 

 

SUBJECTIVE:  The patient is still very short of breath on examination.  I discussed the

case with the nurse.  The patient stated she wants to go home.  I discussed with her

that she can leave against medical advice, but she is not ready for discharge at this

time. 

 

OBJECTIVE:  VITAL SIGNS:  Temperature is 97.6, pulse 80, respiratory rate is 16, blood

pressure is 121/56, and pulse ox 100% on 2 L nasal cannula. 

GENERAL:  Not in acute distress.  Alert and oriented x3.  Cooperative on examination. 

HEENT:  Head is normocephalic and atraumatic.  Eyes; pupils are equal, round, and

reactive to light bilaterally.  Extraocular movements are intact bilaterally.  Throat,

no evidence of erythema or exudates in the posterior pharynx.  Has poor dentition. 

NECK:  Supple.  Good range of motion. 

PULMONARY:  She has decreased breath sounds bilaterally.  She has expiratory wheezing. 

CARDIOVASCULAR:  Positive S1, S2.  No murmurs, rubs, or gallops appreciated. 

ABDOMEN:  Soft, nondistended, and nontender to palpation.  Bowel sounds present. 

MUSCULOSKELETAL:  Strength is 5/5 throughout.  No evidence of any muscle deficits on

examination.  No weakness appreciated. 

NEUROLOGICAL:  Cranial nerves II through XII grossly intact.  No evidence of any

neurological deficits on exam. 

SKIN:  Intact.  Warm to touch.  Good cap refill. 

PSYCHIATRIC:  Normal affect and mood. 

EXTREMITIES:  No edema.  Good range of motion throughout.

 

LAB FINDINGS:  Show white count 19, hemoglobin 10.7, hematocrit 34, and platelets of

332.  Chemistry; sodium 141, potassium 4.6, chloride 107, bicarb 26, anion gap

__________, BUN 24, creatinine is 0.8, glucose is 179, calcium is __________.  LFTs were

normal.  Albumin is 2.5.  Blood cultures were negative. 

 

IMAGING:  None.

 

IMPRESSION:  

1. Acute exacerbation of chronic obstructive pulmonary disease.

2. Influenza B positive.

3. Concerns for community-acquired pneumonia superimposed from underlying flu.

4. Hypertension.

5. Type 2 diabetes.

6. Rheumatoid arthritis.

 

PLAN:  At this time, continue with steroids, neb treatments, and antibiotics.

Vancomycin has been added to the concern for underlying superimposed infection from

underlying flu with community-acquired pneumonia.  Pulmonary and ID are following

closely.  Get a.m. labs.  She is on oxygen, but she is still very short of breath on

examination and she is not ready for discharge.  Though she wants to go home, I

suggested to her to continue stay in and I will leave it up to her what she wants to do,

but she is not ready for discharge at this time.  She will likely 

be here several more days.  The patient refuses the cardiac telemetry as well.  Continue

same plan of care. 

 

 

 

 

______________________________

MD FRANDY Angulo/MODL

D:  04/26/2019 10:25:05

T:  04/26/2019 13:05:42

Job #:  [id number]

## 2019-04-26 NOTE — NUR
Patient transferred from [facility] via wheelchair in stable condition. A&O x2, ambulatory w/ 
minimal stand by assist. Patient is SOB w/ ambulation, NC @ 2L. IV to L AC 20g SL, patent 
and no infiltration noted. Patient denies any pain or issues at this time.

## 2019-04-26 NOTE — NUR
CASE MANAGEMENT INITIAL ASSESSMENT 

 to bedside to discuss plan of care with patient/family. CM/SW role and care 
transitions discussed. Anticipated discharge plan discussed along with duration of care. 
CM/SW discussed patients right to make decisions in care.  CM/SW work hours given.  

Patient lives:  W  IN A 1 STORY HOME W 10 STAIRS TO ENTRANCE

Admit/Transfer:  ER

Hospital/ER visits since last admit:  STATES SHE WAS ADMITTED LAST MONTH TO A [facility] 
NEAR [place], [place] W THE SAME SYMPTOMS

POA/Emergency contact: KRISTI /  @ [phone]

Current/Previous Home Health: PT IS ON SERVICE [facility] / [facility] 1X/WEEK

PCP/Follow-up Care:  DR. DENIS,  HAS PULMONOLOGIST / [facility], BUT STATES SHE WANTS TO CHANGE 
IT, RHEUMATOLOGIST @ [facility]

Current/Previous DME: NEBS AND HOME O2

Medications (referring to index hospitalization or the first time you were in the 
hospital)



a. Were changes made in your medications when you were in the hospital on [date of index 
hospitalization]?     No    



b. Did you understand the changes?    No  



c. Were you able to obtain your new medications right away?  Yes 



d. Were you able to take your medications like the doctor wanted you to?  Yes 



e. Did the hospital give you an accurate, easy to understand list of medications when you 
left? 

 Yes  



Scale of 1-10 how comfortable does patient feel with disease management in outpatient 
settin

Other Services: RECEIVES INFUSIONS MONTHLY FOR OSTEOPOROSIS

Employment Status: RETIRED

Areas of Concerns: CONCERNED ABOUT LTAC STAY BEING TOO LONG.  STATES SHE CARES FOR HER 
 AT HOME.

Referral Needs: LTAC

Education Needs: NONE

IMM/MOON given and signed (if applicable): PT SIGNED IMM

Goal for discharge:  DC TO LTAC X 7-10DAYS, THEN HOME

CM/SW left business card at the bedside with contact information. Name and number was also 
written on the patients whiteboard. Patient verbalized understanding of discussion. CM will 
follow-up with ongoing discharge and transition of care needs.

## 2019-04-26 NOTE — NUR
ORDER RECEIVED FOR LTAC PER DR. SALAZAR.  STATES THE PT AGREED TO GO TO AN LTAC FOR 14 DAYS.  
DISCUSSED 21DAYS OR > AT THE LTAC.  REQUESTED CM TRYA ND IF A DENIAL IS ISSUED, THEN HOME.



CM MET W THE PT AT THE BEDSIDE.  EXPLAINED LTAC TO THE PT.  PT VERBALIZED UNDERSTANDING.  
STATES SHE ONLY WANTS THE HOSPITALIZATION 7-10 DAYS, THEN HOME.  EXPLAINED LTAC 
EXPECTATIONS.  PT AGREED TO 2 WEEKS IN LTAC.  

CHOICE LETTER WAS SIGNED FOR [facility].  

NOTIFIED ARPAN JASON HOPKINS OF EVAL.

## 2019-04-27 VITALS — DIASTOLIC BLOOD PRESSURE: 74 MMHG | SYSTOLIC BLOOD PRESSURE: 153 MMHG

## 2019-04-27 VITALS — DIASTOLIC BLOOD PRESSURE: 77 MMHG | SYSTOLIC BLOOD PRESSURE: 159 MMHG

## 2019-04-27 VITALS — SYSTOLIC BLOOD PRESSURE: 147 MMHG | DIASTOLIC BLOOD PRESSURE: 64 MMHG

## 2019-04-27 VITALS — SYSTOLIC BLOOD PRESSURE: 161 MMHG | DIASTOLIC BLOOD PRESSURE: 72 MMHG

## 2019-04-27 VITALS — DIASTOLIC BLOOD PRESSURE: 71 MMHG | SYSTOLIC BLOOD PRESSURE: 169 MMHG

## 2019-04-27 VITALS — DIASTOLIC BLOOD PRESSURE: 78 MMHG | SYSTOLIC BLOOD PRESSURE: 169 MMHG

## 2019-04-27 LAB
ANION GAP SERPL CALC-SCNC: 12.6 MMOL/L (ref 8–16)
BASOPHILS # BLD AUTO: 0 10*3/UL (ref 0–0.1)
BASOPHILS NFR BLD AUTO: 0.1 % (ref 0–1)
BUN SERPL-MCNC: 27 MG/DL (ref 7–26)
BUN/CREAT SERPL: 37 (ref 6–25)
CALCIUM SERPL-MCNC: 9.1 MG/DL (ref 8.4–10.2)
CHLORIDE SERPL-SCNC: 106 MMOL/L (ref 98–107)
CO2 SERPL-SCNC: 28 MMOL/L (ref 22–29)
DEPRECATED NEUTROPHILS # BLD AUTO: 10.3 10*3/UL (ref 2.1–6.9)
EGFRCR SERPLBLD CKD-EPI 2021: > 60 ML/MIN (ref 60–?)
EOSINOPHIL # BLD AUTO: 0 10*3/UL (ref 0–0.4)
EOSINOPHIL NFR BLD AUTO: 0 % (ref 0–6)
ERYTHROCYTE [DISTWIDTH] IN CORD BLOOD: 13.9 % (ref 11.7–14.4)
GLUCOSE SERPLBLD-MCNC: 133 MG/DL (ref 74–118)
HCT VFR BLD AUTO: 34.5 % (ref 34.2–44.1)
HGB BLD-MCNC: 11 G/DL (ref 12–16)
LYMPHOCYTES # BLD: 0.7 10*3/UL (ref 1–3.2)
LYMPHOCYTES NFR BLD AUTO: 6.5 % (ref 18–39.1)
MCH RBC QN AUTO: 27.3 PG (ref 28–32)
MCHC RBC AUTO-ENTMCNC: 31.9 G/DL (ref 31–35)
MCV RBC AUTO: 85.6 FL (ref 81–99)
MONOCYTES # BLD AUTO: 0.3 10*3/UL (ref 0.2–0.8)
MONOCYTES NFR BLD AUTO: 2.2 % (ref 4.4–11.3)
NEUTS SEG NFR BLD AUTO: 90.2 % (ref 38.7–80)
PLATELET # BLD AUTO: 330 X10E3/UL (ref 140–360)
POTASSIUM SERPL-SCNC: 5.6 MMOL/L (ref 3.5–5.1)
RBC # BLD AUTO: 4.03 X10E6/UL (ref 3.6–5.1)
SODIUM SERPL-SCNC: 141 MMOL/L (ref 136–145)

## 2019-04-27 RX ADMIN — VANCOMYCIN HYDROCHLORIDE SCH MLS/HR: 1 INJECTION, SOLUTION INTRAVENOUS at 03:42

## 2019-04-27 RX ADMIN — CEFEPIME HYDROCHLORIDE SCH MLS/HR: 1 INJECTION, POWDER, FOR SOLUTION INTRAMUSCULAR; INTRAVENOUS at 12:24

## 2019-04-27 RX ADMIN — ENOXAPARIN SODIUM SCH MG: 40 INJECTION SUBCUTANEOUS at 16:56

## 2019-04-27 RX ADMIN — ENOXAPARIN SODIUM SCH MG: 40 INJECTION SUBCUTANEOUS at 17:00

## 2019-04-27 RX ADMIN — METHYLPREDNISOLONE SODIUM SUCCINATE SCH MG: 125 INJECTION, POWDER, FOR SOLUTION INTRAMUSCULAR; INTRAVENOUS at 14:00

## 2019-04-27 RX ADMIN — METHYLPREDNISOLONE SODIUM SUCCINATE SCH MG: 125 INJECTION, POWDER, FOR SOLUTION INTRAMUSCULAR; INTRAVENOUS at 06:27

## 2019-04-27 RX ADMIN — TRAZODONE HYDROCHLORIDE SCH MG: 50 TABLET ORAL at 21:22

## 2019-04-27 RX ADMIN — GUAIFENESIN AND CODEINE PHOSPHATE PRN ML: 100; 10 SOLUTION ORAL at 20:25

## 2019-04-27 RX ADMIN — LEVALBUTEROL HYDROCHLORIDE SCH MG: 1.25 SOLUTION RESPIRATORY (INHALATION) at 15:00

## 2019-04-27 RX ADMIN — Medication SCH MG: at 09:32

## 2019-04-27 RX ADMIN — AMLODIPINE BESYLATE SCH MG: 10 TABLET ORAL at 09:33

## 2019-04-27 RX ADMIN — LEVALBUTEROL HYDROCHLORIDE SCH MG: 1.25 SOLUTION RESPIRATORY (INHALATION) at 23:18

## 2019-04-27 RX ADMIN — LEVALBUTEROL HYDROCHLORIDE SCH MG: 1.25 SOLUTION RESPIRATORY (INHALATION) at 03:00

## 2019-04-27 RX ADMIN — AZITHROMYCIN SCH MLS/HR: 100 INJECTION, POWDER, LYOPHILIZED, FOR SOLUTION INTRAVENOUS at 09:30

## 2019-04-27 RX ADMIN — LEVALBUTEROL HYDROCHLORIDE SCH MG: 1.25 SOLUTION RESPIRATORY (INHALATION) at 10:45

## 2019-04-27 RX ADMIN — VANCOMYCIN HYDROCHLORIDE SCH MLS/HR: 1 INJECTION, SOLUTION INTRAVENOUS at 16:56

## 2019-04-27 RX ADMIN — LEVALBUTEROL HYDROCHLORIDE SCH MG: 1.25 SOLUTION RESPIRATORY (INHALATION) at 19:00

## 2019-04-27 RX ADMIN — CEFEPIME HYDROCHLORIDE SCH MLS/HR: 1 INJECTION, POWDER, FOR SOLUTION INTRAMUSCULAR; INTRAVENOUS at 23:29

## 2019-04-27 RX ADMIN — METHYLPREDNISOLONE SODIUM SUCCINATE SCH MG: 125 INJECTION, POWDER, FOR SOLUTION INTRAMUSCULAR; INTRAVENOUS at 21:22

## 2019-04-27 RX ADMIN — LEVALBUTEROL HYDROCHLORIDE SCH MG: 1.25 SOLUTION RESPIRATORY (INHALATION) at 07:00

## 2019-04-27 NOTE — NUR
Pt K-5.6 and level reported to Dr. Hallman and received orders to give Kayexalate 30g X1 and 
Lasix 40mg IV X1. Also received orders to stop benazepril.

## 2019-04-27 NOTE — PROGRESS NOTE
DATE:  04/27/2019

 

Medicine Progress Note 

 

SUBJECTIVE:  The patient is doing well today.  She does still get short of breath upon

ambulation. 

 

OBJECTIVE:  VITAL SIGNS:  Temperature is 96.9, pulse is 68, respiratory rate is 20,

blood pressure 159/77, pulse ox 97% on 2 L nasal cannula. 

GENERAL:  Not in acute distress.  Alert and oriented x3.  Cooperative on examination. 

HEENT:  Head is normocephalic and atraumatic.  Eyes; pupils are equal, round, and

reactive to light bilaterally.  Extraocular movements are intact bilaterally. 

NECK:  Supple.  Good range of motion.  Throat, no evidence of erythema or exudates in

the posterior pharynx.  Has poor dentition. 

PULMONARY:  Clear to auscultation bilaterally.  No wheezing, no rales, no rhonchi.  No

crackles appreciated. 

CARDIOVASCULAR:  Positive S1, S2.  No murmurs, rubs, or gallops appreciated. 

ABDOMEN:  Soft nondistended, nontender to palpation.  Bowel sounds present. 

MUSCULOSKELETAL:  Strength is 5/5 throughout. No evidence of muscle deficits on

examination. No weakness appreciated. 

NEUROLOGICAL:  Cranial nerves II through XII grossly intact.  No evidence of any

neurological deficits on exam. 

SKIN:  Intact.  Warm to touch.  Good cap refill. 

PSYCHIATRIC:  Normal affect and mood. 

EXTREMITIES:  No edema. Good range of motion throughout.

 

LAB FINDINGS:  Show white count of 11.3, hemoglobin 11, hematocrit is 34, platelets of

330.  Chemistry; sodium is 141, potassium is 5.6, chloride 106, bicarb 28, anion gap of

12, BUN 27, creatinine is 0.73, glucose is 133, calcium is 9.1, CRP pending. 

 

MICROBIOLOGY:  Blood cultures negative.

 

IMAGING STUDIES:  None.

 

IMPRESSION:  

1. Acute exacerbation of chronic obstructive pulmonary disease.

2. Influenza B positive.

3. Concerns for community-acquired pneumonia superimposed from underlying flu.

4. Hypertension.

5. Type 2 diabetes.

6. Rheumatoid arthritis.

 

PLAN:  Continue with steroids, neb treatments, antibiotics.  She is also on IV

vancomycin.  ID and Pulmonary are following.  She still gets very short of breath upon

ambulation.  She was also found to be hyperkalemic today.  I stopped her ACE inhibitor.

Gave her Kayexalate 30 g p.o. x1.  I also gave her Lasix 40 mg IV x1 as well.  Get a.m.

labs.  Avoid potassium foods.  Her renal function was normal though, not sure exactly

the etiology of her higher potassium.  Otherwise, we will continue with same plan of

care and monitor her closely.  I did discuss with her about LTAC.  She seems to

occasionally agree and sometimes not agree.  She really wants to 

go home.  She is not ready for discharge as she has still difficulty ambulating and

becomes very short of breath. 

 

 

 

 

______________________________

MD FRANDY Angulo/MEL

D:  04/27/2019 12:21:35

T:  04/27/2019 14:50:27

Job #:  [id number]

## 2019-04-28 VITALS — DIASTOLIC BLOOD PRESSURE: 72 MMHG | SYSTOLIC BLOOD PRESSURE: 161 MMHG

## 2019-04-28 VITALS — SYSTOLIC BLOOD PRESSURE: 170 MMHG | DIASTOLIC BLOOD PRESSURE: 77 MMHG

## 2019-04-28 VITALS — DIASTOLIC BLOOD PRESSURE: 77 MMHG | SYSTOLIC BLOOD PRESSURE: 162 MMHG

## 2019-04-28 VITALS — DIASTOLIC BLOOD PRESSURE: 71 MMHG | SYSTOLIC BLOOD PRESSURE: 154 MMHG

## 2019-04-28 VITALS — DIASTOLIC BLOOD PRESSURE: 77 MMHG | SYSTOLIC BLOOD PRESSURE: 170 MMHG

## 2019-04-28 VITALS — SYSTOLIC BLOOD PRESSURE: 159 MMHG | DIASTOLIC BLOOD PRESSURE: 71 MMHG

## 2019-04-28 VITALS — SYSTOLIC BLOOD PRESSURE: 141 MMHG | DIASTOLIC BLOOD PRESSURE: 65 MMHG

## 2019-04-28 VITALS — SYSTOLIC BLOOD PRESSURE: 174 MMHG | DIASTOLIC BLOOD PRESSURE: 79 MMHG

## 2019-04-28 LAB
ANION GAP SERPL CALC-SCNC: 16.9 MMOL/L (ref 8–16)
BASOPHILS # BLD AUTO: 0 10*3/UL (ref 0–0.1)
BASOPHILS NFR BLD AUTO: 0.1 % (ref 0–1)
BUN SERPL-MCNC: 28 MG/DL (ref 7–26)
BUN/CREAT SERPL: 30 (ref 6–25)
CALCIUM SERPL-MCNC: 8.7 MG/DL (ref 8.4–10.2)
CHLORIDE SERPL-SCNC: 101 MMOL/L (ref 98–107)
CO2 SERPL-SCNC: 28 MMOL/L (ref 22–29)
DEPRECATED NEUTROPHILS # BLD AUTO: 8.1 10*3/UL (ref 2.1–6.9)
EGFRCR SERPLBLD CKD-EPI 2021: 58 ML/MIN (ref 60–?)
EOSINOPHIL # BLD AUTO: 0 10*3/UL (ref 0–0.4)
EOSINOPHIL NFR BLD AUTO: 0 % (ref 0–6)
ERYTHROCYTE [DISTWIDTH] IN CORD BLOOD: 13.9 % (ref 11.7–14.4)
GLUCOSE SERPLBLD-MCNC: 127 MG/DL (ref 74–118)
HCT VFR BLD AUTO: 37.9 % (ref 34.2–44.1)
HGB BLD-MCNC: 12.2 G/DL (ref 12–16)
LYMPHOCYTES # BLD: 1 10*3/UL (ref 1–3.2)
LYMPHOCYTES NFR BLD AUTO: 10 % (ref 18–39.1)
MCH RBC QN AUTO: 27.3 PG (ref 28–32)
MCHC RBC AUTO-ENTMCNC: 32.2 G/DL (ref 31–35)
MCV RBC AUTO: 84.8 FL (ref 81–99)
MONOCYTES # BLD AUTO: 0.4 10*3/UL (ref 0.2–0.8)
MONOCYTES NFR BLD AUTO: 3.8 % (ref 4.4–11.3)
NEUTS SEG NFR BLD AUTO: 85.1 % (ref 38.7–80)
PLATELET # BLD AUTO: 328 X10E3/UL (ref 140–360)
POTASSIUM SERPL-SCNC: 2.9 MMOL/L (ref 3.5–5.1)
RBC # BLD AUTO: 4.47 X10E6/UL (ref 3.6–5.1)
SODIUM SERPL-SCNC: 143 MMOL/L (ref 136–145)

## 2019-04-28 RX ADMIN — AZITHROMYCIN SCH MLS/HR: 100 INJECTION, POWDER, LYOPHILIZED, FOR SOLUTION INTRAVENOUS at 08:27

## 2019-04-28 RX ADMIN — GUAIFENESIN AND CODEINE PHOSPHATE PRN ML: 100; 10 SOLUTION ORAL at 21:10

## 2019-04-28 RX ADMIN — TRAZODONE HYDROCHLORIDE SCH MG: 50 TABLET ORAL at 22:55

## 2019-04-28 RX ADMIN — LEVALBUTEROL HYDROCHLORIDE SCH MG: 1.25 SOLUTION RESPIRATORY (INHALATION) at 10:45

## 2019-04-28 RX ADMIN — LEVALBUTEROL HYDROCHLORIDE SCH MG: 1.25 SOLUTION RESPIRATORY (INHALATION) at 14:00

## 2019-04-28 RX ADMIN — LEVALBUTEROL HYDROCHLORIDE SCH MG: 1.25 SOLUTION RESPIRATORY (INHALATION) at 03:00

## 2019-04-28 RX ADMIN — VANCOMYCIN HYDROCHLORIDE SCH MLS/HR: 1 INJECTION, SOLUTION INTRAVENOUS at 03:00

## 2019-04-28 RX ADMIN — POTASSIUM CHLORIDE SCH MEQ: 1500 TABLET, EXTENDED RELEASE ORAL at 11:05

## 2019-04-28 RX ADMIN — CEFEPIME HYDROCHLORIDE SCH MLS/HR: 1 INJECTION, POWDER, FOR SOLUTION INTRAMUSCULAR; INTRAVENOUS at 12:00

## 2019-04-28 RX ADMIN — CEFEPIME HYDROCHLORIDE SCH MLS/HR: 1 INJECTION, POWDER, FOR SOLUTION INTRAMUSCULAR; INTRAVENOUS at 23:34

## 2019-04-28 RX ADMIN — POTASSIUM CHLORIDE SCH MEQ: 1500 TABLET, EXTENDED RELEASE ORAL at 17:26

## 2019-04-28 RX ADMIN — LEVALBUTEROL HYDROCHLORIDE SCH MG: 1.25 SOLUTION RESPIRATORY (INHALATION) at 23:15

## 2019-04-28 RX ADMIN — METHYLPREDNISOLONE SODIUM SUCCINATE SCH MG: 125 INJECTION, POWDER, FOR SOLUTION INTRAMUSCULAR; INTRAVENOUS at 17:26

## 2019-04-28 RX ADMIN — AMLODIPINE BESYLATE SCH MG: 10 TABLET ORAL at 08:27

## 2019-04-28 RX ADMIN — LEVALBUTEROL HYDROCHLORIDE SCH MG: 1.25 SOLUTION RESPIRATORY (INHALATION) at 06:45

## 2019-04-28 RX ADMIN — ENOXAPARIN SODIUM SCH MG: 40 INJECTION SUBCUTANEOUS at 17:00

## 2019-04-28 RX ADMIN — Medication SCH MG: at 08:27

## 2019-04-28 RX ADMIN — LEVALBUTEROL HYDROCHLORIDE SCH MG: 1.25 SOLUTION RESPIRATORY (INHALATION) at 19:26

## 2019-04-28 RX ADMIN — METHYLPREDNISOLONE SODIUM SUCCINATE SCH MG: 125 INJECTION, POWDER, FOR SOLUTION INTRAMUSCULAR; INTRAVENOUS at 06:02

## 2019-04-28 NOTE — NUR
Called Dr. Hallman to report K level of 2.9 and received orders to redraw k level stat and call 
with results.

## 2019-04-28 NOTE — NUR
Dr. Hallman here to see pt and new level of K is 2.8 and received orders for oral potassium 40 
meq x2 for today.

## 2019-04-28 NOTE — PROGRESS NOTE
DATE:  04/28/2019

 

Medicine Progress Note 

 

SUBJECTIVE:  The patient is doing much better today.  She is ambulating, not short of

breath.  She will probably be ready for discharge tomorrow.  No overnight events. 

 

OBJECTIVE:  VITAL SIGNS:  Temperature is 95.6, pulse 88, respiratory rate is 20, blood

pressure 141/65, pulse ox is 95% and she is on 2 L nasal cannula chronically. 

GENERAL:  Not in acute distress.  Alert and oriented x3.  Cooperative on examination. 

HEENT:  Head is normocephalic and atraumatic.  Eyes; pupils are equal, round, and

reactive to light bilaterally.  Extraocular movements are intact bilaterally.  Throat,

no evidence of any erythema or exudates in the posterior pharynx.  Has poor dentition. 

NECK:  Supple.  Good range of motion. 

PULMONARY:  Clear to auscultation bilaterally.  No wheezing, no rales, no rhonchi, no

crackles appreciated. 

CARDIOVASCULAR:  Positive S1, S2.  No murmurs, rubs, or gallops appreciated. 

ABDOMEN:  Soft, nondistended, and nontender to palpation.  Bowel sounds present. 

MUSCULOSKELETAL:  Strength is 5/5 throughout.  No evidence of any muscle deficits on

examination. 

SKIN:  Intact.  Warm to touch.  Good cap refill. 

PSYCHIATRIC:  Normal affect and mood. 

EXTREMITIES:  No edema.  Good range of motion throughout.

 

LABORATORY DATA:  Lab findings show white count 9.5, hemoglobin 12, hematocrit 37.9, and

platelets of 328.  Coagulation; PT 13, INR 0.98.  Chemistry; sodium 143, potassium 2.8,

chloride 101, bicarb 28, anion gap of 16, BUN is 28, creatinine is 0.94, glucose is 127,

calcium is 8.7.  Vanc trough was elevated at 33. 

 

MICROBIOLOGY:  Negative.

 

IMAGING STUDIES:  None.

 

IMPRESSION:  

1. Acute exacerbation of chronic obstructive pulmonary disease.

2. Influenza B positive.

3. Community-acquired pneumonia superimposed underlying from flu.

4. Hypertension.

5. Type 2 diabetes mellitus.

6. Rheumatoid arthritis.

7. Hypokalemia.

 

PLAN:  Continue with steroids at a lower dose.  Neb treatments, antibiotics.  Vanc

trough is elevated, we will hold vancomycin.  ID and Pulmonary following.  We will

replace potassium. 

Get a.m. labs.  The patient is actually doing very well and she could probably

potentially be discharged home tomorrow.  I discussed this with the nurse and the

patient. 

 

 

 

______________________________

MD FRANDY Angulo/MEL

D:  04/28/2019 09:50:50

T:  04/28/2019 10:50:59

Job #:  [id number]

## 2019-04-29 VITALS — DIASTOLIC BLOOD PRESSURE: 70 MMHG | SYSTOLIC BLOOD PRESSURE: 157 MMHG

## 2019-04-29 VITALS — SYSTOLIC BLOOD PRESSURE: 154 MMHG | DIASTOLIC BLOOD PRESSURE: 69 MMHG

## 2019-04-29 VITALS — DIASTOLIC BLOOD PRESSURE: 72 MMHG | SYSTOLIC BLOOD PRESSURE: 152 MMHG

## 2019-04-29 VITALS — SYSTOLIC BLOOD PRESSURE: 158 MMHG | DIASTOLIC BLOOD PRESSURE: 77 MMHG

## 2019-04-29 VITALS — SYSTOLIC BLOOD PRESSURE: 146 MMHG | DIASTOLIC BLOOD PRESSURE: 69 MMHG

## 2019-04-29 LAB
ANION GAP SERPL CALC-SCNC: 13.4 MMOL/L (ref 8–16)
BASOPHILS # BLD AUTO: 0 10*3/UL (ref 0–0.1)
BASOPHILS NFR BLD AUTO: 0.1 % (ref 0–1)
BUN SERPL-MCNC: 32 MG/DL (ref 7–26)
BUN/CREAT SERPL: 39 (ref 6–25)
CALCIUM SERPL-MCNC: 8.2 MG/DL (ref 8.4–10.2)
CHLORIDE SERPL-SCNC: 103 MMOL/L (ref 98–107)
CO2 SERPL-SCNC: 29 MMOL/L (ref 22–29)
DEPRECATED NEUTROPHILS # BLD AUTO: 5.9 10*3/UL (ref 2.1–6.9)
EGFRCR SERPLBLD CKD-EPI 2021: > 60 ML/MIN (ref 60–?)
EOSINOPHIL # BLD AUTO: 0 10*3/UL (ref 0–0.4)
EOSINOPHIL NFR BLD AUTO: 0 % (ref 0–6)
ERYTHROCYTE [DISTWIDTH] IN CORD BLOOD: 13.7 % (ref 11.7–14.4)
GLUCOSE SERPLBLD-MCNC: 126 MG/DL (ref 74–118)
HCT VFR BLD AUTO: 32.9 % (ref 34.2–44.1)
HGB BLD-MCNC: 10.5 G/DL (ref 12–16)
LYMPHOCYTES # BLD: 0.6 10*3/UL (ref 1–3.2)
LYMPHOCYTES NFR BLD AUTO: 8.8 % (ref 18–39.1)
MCH RBC QN AUTO: 27.1 PG (ref 28–32)
MCHC RBC AUTO-ENTMCNC: 31.9 G/DL (ref 31–35)
MCV RBC AUTO: 84.8 FL (ref 81–99)
MONOCYTES # BLD AUTO: 0.6 10*3/UL (ref 0.2–0.8)
MONOCYTES NFR BLD AUTO: 8 % (ref 4.4–11.3)
NEUTS SEG NFR BLD AUTO: 81.9 % (ref 38.7–80)
PLATELET # BLD AUTO: 239 X10E3/UL (ref 140–360)
POTASSIUM SERPL-SCNC: 3.4 MMOL/L (ref 3.5–5.1)
RBC # BLD AUTO: 3.88 X10E6/UL (ref 3.6–5.1)
SODIUM SERPL-SCNC: 142 MMOL/L (ref 136–145)

## 2019-04-29 RX ADMIN — METHYLPREDNISOLONE SODIUM SUCCINATE SCH MG: 125 INJECTION, POWDER, FOR SOLUTION INTRAMUSCULAR; INTRAVENOUS at 16:22

## 2019-04-29 RX ADMIN — CEFEPIME HYDROCHLORIDE SCH MLS/HR: 1 INJECTION, POWDER, FOR SOLUTION INTRAMUSCULAR; INTRAVENOUS at 11:22

## 2019-04-29 RX ADMIN — LEVALBUTEROL HYDROCHLORIDE SCH MG: 1.25 SOLUTION RESPIRATORY (INHALATION) at 11:17

## 2019-04-29 RX ADMIN — LEVALBUTEROL HYDROCHLORIDE SCH MG: 1.25 SOLUTION RESPIRATORY (INHALATION) at 07:31

## 2019-04-29 RX ADMIN — AMLODIPINE BESYLATE SCH MG: 10 TABLET ORAL at 09:06

## 2019-04-29 RX ADMIN — METHYLPREDNISOLONE SODIUM SUCCINATE SCH MG: 125 INJECTION, POWDER, FOR SOLUTION INTRAMUSCULAR; INTRAVENOUS at 09:06

## 2019-04-29 RX ADMIN — AZITHROMYCIN SCH MLS/HR: 100 INJECTION, POWDER, LYOPHILIZED, FOR SOLUTION INTRAVENOUS at 09:00

## 2019-04-29 RX ADMIN — Medication SCH MG: at 09:06

## 2019-04-29 RX ADMIN — LEVALBUTEROL HYDROCHLORIDE SCH MG: 1.25 SOLUTION RESPIRATORY (INHALATION) at 03:00

## 2019-04-29 NOTE — NUR
OXYGEN TANK ARRIVED. PATIENT DISCHARGE HOME- PATIENT OFF THE UNIT AT 1643 PER WHEELCHAIR 
ACCOMPANIED BY PCT TO THE FRONT LOBBY. PATIENT DISCHARGE WITH O2 SET AT 2L NC. PATIENT IS IN 
STABLE CONDITION WITH NO S/S OF RESPIRATORY DISTRESS. NO PAIN VOICED. IV REMOVED WITH TIP 
INTACT AT 0910. DISCHARGE INSTRUCTIONS, TEACHING, AND MEDICATIONS GIVEN TO THE PATIENT. ALL 
PERSONAL ITEMS TAKEN WITH THE PATIENT AND HER SON.

## 2019-04-29 NOTE — NUR
PATIENT AND SON INFORMED TO CALL OXYGEN COMPANY TO PROVIDE A NEW O2 TANK PRIOR TO DISCHARGE. 
DR. STALLWORTH ON THE UNIT- PATIENT ABLE TO DISCHARGE TODAY AFTER SPEAKING WITH DR. SALAZAR AND 
ONCE O2 IS AVAILABLE FOR THE PATIENT.

## 2019-04-29 NOTE — NUR
RECEIVED CALL FROM NURSE STATING THE PT HAS BEEN DISCHARGED, BUT THE O2 TANK FROM HOME WAS 
EMPTY.



MET W THE PT AND SPOUSE AT THE BEDSIDE.  CALL WAS PLACED TO [facility] BY THE PT.  SHE 
ARRANGED FOR AN O2 TANK TO BE DELIVERED TO HER ROOM.



IMM LETTER WAS EXPLAINED.  PT VERBALIZED UNDERSTANDING.  IMM LETTER WAS SIGNED.  COPY TO PT 
AND COPY TO CHART

## 2019-04-29 NOTE — PROGRESS NOTE
DATE:  04/29/2019

 

Medicine Progress Note 

 

SUBJECTIVE:  The patient is doing much better today with no other issues.  She did not

bring her home oxygen tank for me to discharge her.  We will discuss this with case

management.  The patient has been cleared by couple of consultants. 

 

OBJECTIVE:  VITAL SIGNS:  Temperature 97.1, pulse 87, respiratory rate is 20, blood

pressure 150/77, pulse ox 98% on 2 L nasal cannula. 

GENERAL:  Not in acute distress.  Alert and oriented x3.  Cooperative on examination. 

HEENT:  Head is normocephalic and atraumatic.  Eyes; pupils are equal, round, and

reactive to light bilaterally.  Extraocular movements are intact bilaterally.  Throat,

no evidence of erythema or exudates in the posterior pharynx.  Has poor dentition. 

NECK:  Supple.  Good range of motion. 

PULMONARY:  Clear to auscultation bilaterally.  No wheezing, no rales, no rhonchi, no

crackles appreciated. 

CARDIOVASCULAR:  Positive S1, S2.  No murmurs, rubs, or gallops appreciated. 

ABDOMEN:  Soft, nondistended, and nontender to palpation.  Bowel sounds present. 

MUSCULOSKELETAL:  Strength is 5/5 throughout.  No evidence of any muscle deficits on

examination.  No weakness appreciated. 

NEUROLOGICAL:  Cranial nerves II through XII grossly intact.  No evidence of any

neurological deficits on exam. 

SKIN:  Intact.  Warm to touch.  Good cap refill. 

PSYCHIATRIC:  Normal affect and mood. 

EXTREMITIES:  No edema.  Good range of motion throughout.

 

LABORATORY DATA:  Lab findings show white count 7.2, hemoglobin 10.5, hematocrit is 33,

and platelets of 239.  Chemistry; sodium 142, potassium 3.4, chloride 103, bicarb 29,

anion gap of 13, BUN is 22, creatinine is 0.83, glucose is 126, and calcium is 8.2. 

 

MICROBIOLOGY:  Negative.

 

IMPRESSION:  

1. Acute exacerbation of chronic obstructive pulmonary disease.

2. Influenza B positive.

3. Community-acquired pneumonia superimposed from flu.

4. Hypertension.

5. Type 2 diabetes mellitus.

6. Rheumatoid arthritis.

7. Hypokalemia.

 

PLAN:  Possibly discharge home today __________ antibiotics as well, which will be per

ID.  Pulmonary is following.  The patient is mainly __________ we will replace

potassium. 

 

 

 

 

______________________________

MD FRANDY Angulo/MEL

D:  04/29/2019 11:42:47

T:  04/29/2019 13:40:31

Job #:  [id number]

## 2019-04-30 NOTE — DISCHARGE SUMMARY
FINAL DISCHARGE DIAGNOSES:  

1. Acute exacerbation of chronic obstructive pulmonary disease.

2. Influenza B positive, complete treatment.

3. Community-acquired pneumonia.  Discharged on oral Augmentin.

4. Hypertension.

5. Type 2 diabetes.

6. Rheumatoid arthritis.

7. Oxygen dependent.

8. Hypokalemia, resolved.

 

CONSULTANTS:  ID, Pulmonary.

 

PHYSICAL EXAMINATION:

VITAL SIGNS:  Temperature is 97.2, pulse 86, respiratory rate is 18, blood pressure

146/69, and pulse ox is 98% on 2 L nasal cannula. 

LABORATORY DATA:  Lab findings show white count 7.2, hemoglobin 10.5, hematocrit is 32,

and platelets of 239.  Chemistry; sodium 142, potassium 3.4, chloride 103, bicarb 29,

anion gap of 13, BUN is 22, creatinine is 0.83, glucose is 126, calcium is 8.2.

Troponins were all negative.  Albumin was 2.5.  CRP slightly elevated at 19.  CK 66,

normal.  LFTs were normal.  Total bilirubin is 0.2.  Magnesium 2.2.  Vanc trough 16.

Serology, flu B positive. 

 

MICROBIOLOGY:  Blood cultures were negative x2.

 

IMAGING STUDIES:  Chest x-ray was negative.  CT chest shows no evidence of pulmonary

embolism.  Pulmonary edema was present.  There is some scattered tree-in-bud nodularity

predominantly in the right lower lung, may represent or reflect infectious bronchiolitis

or aspiration. 

 

HOSPITAL COURSE:  This is a 74-year-old  female, who came into the ED with

underlying cough, congestion, shortness of breath and wheezing.  The patient was

admitted and Pulmonary and ID were consulted.  The patient was being treated for

underlying COPD exacerbation, was also found to have flu B positive as well as

community-acquired pneumonia.  The patient was on IV steroids, neb treatments, and

antibiotics.  The patient improved throughout the hospital course.  The patient

continued with Tamiflu treatment while here in the hospital.  She was discharged on oral

antibiotics as per ID recommendations on Augmentin.  The patient's oxygen requirement

has decreased tremendously.  The patient is already on home O2, 2-3 L.  Home O2 was

arranged as well as confirmed that she has at home.  This was confirmed by Case

Management.  She was otherwise stable at baseline with no other complaints.  She was

cleared for discharge by both Pulmonary and ID for discharge home.  On the day of

discharge, vital signs stable, labs reviewed and stable.  The patient was seen,

evaluated, examined thoroughly on the day of discharge, no other complaints.  The

patient verbalized understanding and agreed to plan of care to follow up as an

outpatient with the primary care physician in 1 week time. 

 

MEDICATIONS:  See med reconciliation form including Augmentin as per ID recommendations.

 

DISPOSITION:  Home.

 

CONDITION:  Stable.

 

DIET:  Heart healthy. 

 

In the event of any worsening symptoms, the patient was advised to come back to the ED

for further evaluation. 

 

Discharge summary took greater than 35 minutes.

 

 

 

 

______________________________

MD FRANDY Angulo/MODL

D:  04/30/2019 10:47:14

T:  04/30/2019 19:40:58

Job #:  [id number]

## 2024-06-03 NOTE — CONSULTATION
"Subjective   Patient ID: Yaritza Bernal is a 73 y.o. female who presents for Med Refill.    HPI   The patient presents to the clinic for medication refills. She has past medical history of HTN, chest pain, HLD, SNHL bilateral ears, allergic rhinitis, hypothyroidism, vitamin D deficiency, heartburn, urine incontinence, anxiety, MDD, bilateral knee arthritis, benign essential tremor, polyneuropathy, mild persistent asthma, and eczema.    The patient recalls that she visited the ER on 5/23/2024 with complaints of left calf pain (radiating to medial thigh). The pain in her left leg started several days prior to her hospital visit. She had severe swelling in her left leg during this time. Lab results done at the hospital were mostly WNL. She had a vascular ultrasound and she was diagnosed with thrombosis of left greater saphenous vein (believed to be both acute and chronic).  Notably, the patient has history of DVT in her left lower extremity after orthopedic surgery (~2022). Consequently, she was started on Eliquis 5 mg medication and discharged from the hospital in stable condition. Now, the patient states that she is feeling much better on her Eliquis 5 mg medication (1 tablet in the AM and PM).  She states her pain is gone and no side effects to meds    Her blood pressure (106/65) was within normal range when checked in the clinic today. She continues on bisoprolol-HCTZ 10-6.25 mg (2 tablets once daily) for treatment of hypertension. She denies any side-effects to her bisoprolol-HCTZ medication.    She continues on gabapentin 600 mg (3 times daily) for treatment of physiological tremors. She denies any side-effects to her gabapentin medication.  Needs her UDS/CSA    Review of Systems    Objective   /65   Pulse 72   Temp 36.8 °C (98.3 °F)   Resp 18   Ht 1.778 m (5' 10\")   Wt 93.4 kg (206 lb)   SpO2 92%   BMI 29.56 kg/m²     Physical Exam  Constitutional:       Appearance: She is obese.   Neck:      " DATE OF CONSULTATION:  04/25/2019

 

Pulmonary Consultation 

 

HISTORY OF PRESENT ILLNESS:  Ms. Brothers is a 74-year-old woman with past medical

history of emphysema and rheumatoid arthritis, who apparently was treated in the

hospital in [place], hospitalized for about 8 days, but had continued shortness of

breath.  She does not know her diagnosis, but she was given antibiotics and breathing

treatments here.  She presents with cough and shortness of breath.  She denies fevers,

chills, or night sweats.  She has dyspnea with exertion. 

 

She uses oxygen at home and Trilogy at home.

 

PAST MEDICAL HISTORY:  Notable for rheumatoid arthritis for which she is taking

methotrexate 20 mg a week.  She is getting some infusion and taking Prednisone.

Hypertension, diabetes, emphysema, and chronic hypoxemic respiratory failure. 

 

PAST SURGICAL HISTORY:  None.

 

SOCIAL HISTORY:  She smoked two packs a day, quit about 10 years ago, but she smoked for

over 60 years.  No drugs.  No alcohol.  She is , her  is disabled

secondary to stroke. 

 

ALLERGIES:  NONE.

 

PHYSICAL EXAMINATION:

VITAL SIGNS:  She is afebrile, heart rate is in the 80s, respiratory rate is 18 to 22,

she is 100% saturation but currently getting neb treatment, blood pressure 141/81. 

GENERAL APPEARANCE:  An elderly woman, looks her stated age, not in distress. 

HEENT:  Normocephalic, atraumatic.  Pupils are round and reactive.  Mucous membranes are

moist. 

NECK:  Supple.  Trachea is midline. 

CHEST:  Barrel shaped.  She is thin.  There are qualitatively decreased breath sounds. 

HEART:  Regular rate and rhythm. 

ABDOMEN:  Soft. 

EXTREMITIES:  There is no cyanosis, clubbing, or edema.  The patient does have marked

rheumatologic changes of the hands. 

LABORATORY DATA:  White count is 24.5 thousand today, hemoglobin and hematocrit of 10

and 32 with 335,000 platelets and a left shift.  Serology is positive for influenza B.

Coags are normal.  Chemistry is not particularly remarkable because it was 154 most

recently.  Blood cultures are negative so far.  They said there is a sputum culture

pending. 

 

IMAGING:  Chest x-ray was done and showed COPD changes.  Some prominence of the

interstitium, which I think more related to the Bullous disease.  She also had a CT of

the chest done, which I have reviewed showed marked emphysematous changes with

questionable __________.  Radiology is describing tree-in-bud in the right base.  There

are no filling defects to suggest PE.  There were no pleural effusions. 

 

ASSESSMENT:  

1. Chronic obstructive pulmonary disease/emphysema with acute exacerbation. 

2. Questionable right lower lobe pneumonitis.

3. Influenza B infection.

4. Leukocytosis.

5. Immunocompromised host.

6. Rheumatoid arthritis.

7. History of nicotine dependence for cigarettes, complicated by emphysema.

 

RECOMMENDATIONS AND PLAN:  I will lower the steroids, but they will be continued.  I

will broaden the antibiotics and she was hospitalized for 8 days and could have

nosocomial pathogens.  She can continue her Trilogy and her azithromycin and her DVT

prophylaxis.  Continue with activity and isolation.  She may benefit from an Infectious

Disease consultation if her white blood cell count is not significantly improved post

change in antibiotics. 

 

 

 

 

______________________________

MD LUCÍA Hernandez/MODL

D:  04/25/2019 11:48:38

T:  04/25/2019 17:28:41

Job #:  [id number] Vascular: No carotid bruit.   Cardiovascular:      Rate and Rhythm: Normal rate and regular rhythm.      Pulses: Normal pulses.      Heart sounds: Normal heart sounds.   Pulmonary:      Effort: Pulmonary effort is normal.      Breath sounds: Normal breath sounds.   Musculoskeletal:         General: Swelling present.      Cervical back: Normal range of motion.      Comments: L calf still larger than R but much improved  per pt since starting Eliquis.     Skin:     General: Skin is warm and dry.   Neurological:      Mental Status: She is alert and oriented to person, place, and time.      Gait: Gait abnormal.      Comments: Using 2 walking sticks   Psychiatric:         Mood and Affect: Mood normal.         Thought Content: Thought content normal.         Judgment: Judgment normal.         Assessment/Plan   Problem List Items Addressed This Visit             ICD-10-CM    Benign essential hypertension I10    Benign essential tremor G25.0    Relevant Medications    bisoproloL-hydrochlorothiazide (Ziac) 10-6.25 mg tablet    Hyperlipidemia E78.5    Hypothyroid E03.9     Other Visit Diagnoses         Codes    Acute deep vein thrombosis (DVT) of left lower extremity, unspecified vein (Multi)    -  Primary I82.402    Relevant Medications    apixaban (Eliquis) 5 mg tablet    Other Relevant Orders    Referral to Hematology and Oncology    Greater saphenous vein embolism, left     I82.812    Neuropathy     G62.9    Relevant Orders    Gabapentin,Urine    Drug Screen, Urine With Reflex to Confirmation    Long term use of drug     Z79.899    Relevant Orders    Drug Screen, Urine With Reflex to Confirmation               In regards to her gabapentin prescription, a UDS/CSA was conducted on the patient in the clinic today.    In regards to her recent hospital episode and concerns with 2nd DVT in the left lower extremity, the patient's labs and imaging results were comprehensively reviewed. The patient received a referral to  hematology for further evaluation of DVT in her left lower extremity (2 occurrences of this condition in the past ~2 years). For now, she received a refill for her Eliquis 5 mg medication and she was instructed to continue taking Eliquis 5 mg as previously directed (1 tablet 2 times daily). Continue monitoring symptoms for improvement/exacerbation.    In regards to her hypertension, the patient received a refill for her bisoprolol-HCTZ 10-6.25 mg medication for treatment of this condition. She was instructed to continue taking her bisoprolol-HCTZ medication as previously directed. Continue monitoring blood pressure at home regularly.    She will follow-up in 3 months, unless otherwise needed.    Scribe Attestation  By signing my name below, IVirginia Scribe   attest that this documentation has been prepared under the direction and in the presence of Madyson Sandoval DO.